# Patient Record
Sex: FEMALE | Race: OTHER | Employment: FULL TIME | URBAN - METROPOLITAN AREA
[De-identification: names, ages, dates, MRNs, and addresses within clinical notes are randomized per-mention and may not be internally consistent; named-entity substitution may affect disease eponyms.]

---

## 2022-09-20 ENCOUNTER — OFFICE VISIT (OUTPATIENT)
Dept: GASTROENTEROLOGY | Age: 27
End: 2022-09-20

## 2022-09-20 VITALS — WEIGHT: 147 LBS | HEIGHT: 60 IN | OXYGEN SATURATION: 99 % | BODY MASS INDEX: 28.86 KG/M2 | HEART RATE: 61 BPM

## 2022-09-20 DIAGNOSIS — R10.12 LEFT UPPER QUADRANT ABDOMINAL PAIN: Primary | ICD-10-CM

## 2022-09-20 PROCEDURE — 99203 OFFICE O/P NEW LOW 30 MIN: CPT | Performed by: SPECIALIST

## 2022-09-20 RX ORDER — ONDANSETRON 4 MG/1
TABLET, ORALLY DISINTEGRATING ORAL
COMMUNITY
Start: 2022-09-06 | End: 2022-11-01

## 2022-09-20 RX ORDER — PANTOPRAZOLE SODIUM 40 MG/1
TABLET, DELAYED RELEASE ORAL
COMMUNITY
Start: 2022-09-06 | End: 2022-11-01

## 2022-09-20 ASSESSMENT — ENCOUNTER SYMPTOMS
CONSTIPATION: 0
RESPIRATORY NEGATIVE: 1
NAUSEA: 0
ABDOMINAL PAIN: 1
BLOOD IN STOOL: 0
ABDOMINAL DISTENTION: 0
RECTAL PAIN: 0
VOMITING: 0
EYES NEGATIVE: 1
DIARRHEA: 0
ANAL BLEEDING: 0

## 2022-09-20 NOTE — PROGRESS NOTES
Gastroenterology Clinic Visit    Valentine Gauthier  [id number]  Chief Complaint   Patient presents with    New Patient     Patient referred for left sided stomach pain, has been told in the past she has had an ulcer        HPI: 33 y.o. female presents to the clinic with history of abdominal pain at site about few weeks ago and was evaluated at [address] [place] [address] and was referred for GI follow-up, patient was placed on Zofran and Protonix, some improvement in symptoms but recently patient has noticed recurrence of pain in the left side of the abdomen, no emesis, no history of heartburn. noticed small amount of bright red blood in the stool, patient has been recently constipated. Seems rectal bleeding occurs usually when patient strains. No mucus in the stool. Family history negative for IBD. Social history smokes occasionally and also drinks alcohol occasionally. Surgical history includes tonsillectomy. Patient had similar symptoms of abdominal pain about 4 years ago and was told that she may have had ulcer. Patient is not on aspirin or NSAIDs    Review of Systems   Constitutional: Negative. HENT: Negative. Eyes: Negative. Respiratory: Negative. Cardiovascular: Negative. Gastrointestinal:  Positive for abdominal pain. Negative for abdominal distention, anal bleeding, blood in stool, constipation, diarrhea, nausea, rectal pain and vomiting. Endocrine: Negative. Genitourinary: Negative. Musculoskeletal: Negative. Skin: Negative. Allergic/Immunologic: Negative for food allergies. Neurological: Negative. Hematological: Negative. Psychiatric/Behavioral: Negative. No past medical history on file. No past surgical history on file.   Current Outpatient Medications on File Prior to Visit   Medication Sig Dispense Refill    pantoprazole (PROTONIX) 40 MG tablet TAKE 1 TABLET BY MOUTH ONCE DAILY FOR 30 DAYS      ondansetron (ZOFRAN-ODT) 4 MG disintegrating tablet TAKE inappropriate. If there are questions or concerns please feel free to contact me to clarify.

## 2022-10-20 ENCOUNTER — HOSPITAL ENCOUNTER (OUTPATIENT)
Age: 27
Setting detail: OUTPATIENT SURGERY
Discharge: HOME | End: 2022-10-20
Attending: SPECIALIST | Admitting: SPECIALIST

## 2022-10-20 ENCOUNTER — ANESTHESIA EVENT (OUTPATIENT)
Dept: ENDOSCOPY | Age: 27
End: 2022-10-20

## 2022-10-20 ENCOUNTER — ANESTHESIA (OUTPATIENT)
Dept: ENDOSCOPY | Age: 27
End: 2022-10-20

## 2022-10-20 VITALS
BODY MASS INDEX: 28.86 KG/M2 | WEIGHT: 147 LBS | HEIGHT: 60 IN | HEART RATE: 61 BPM | OXYGEN SATURATION: 99 % | SYSTOLIC BLOOD PRESSURE: 107 MMHG | TEMPERATURE: 97.7 F | RESPIRATION RATE: 18 BRPM | DIASTOLIC BLOOD PRESSURE: 68 MMHG

## 2022-10-20 DIAGNOSIS — R10.12 LEFT UPPER QUADRANT ABDOMINAL PAIN: Primary | ICD-10-CM

## 2022-10-20 DIAGNOSIS — R10.12 LEFT UPPER QUADRANT ABDOMINAL PAIN: ICD-10-CM

## 2022-10-20 LAB
HCG, URINE, POC: NEGATIVE
Lab: NORMAL
NEGATIVE QC PASS/FAIL: NORMAL
POSITIVE QC PASS/FAIL: NORMAL

## 2022-10-20 PROCEDURE — 43239 EGD BIOPSY SINGLE/MULTIPLE: CPT | Performed by: SPECIALIST

## 2022-10-20 PROCEDURE — 3700000000 HC ANESTHESIA ATTENDED CARE: Performed by: SPECIALIST

## 2022-10-20 PROCEDURE — 3609017100 HC EGD: Performed by: SPECIALIST

## 2022-10-20 PROCEDURE — 7100000010 HC PHASE II RECOVERY - FIRST 15 MIN: Performed by: SPECIALIST

## 2022-10-20 PROCEDURE — 6370000000 HC RX 637 (ALT 250 FOR IP): Performed by: SPECIALIST

## 2022-10-20 PROCEDURE — 6360000002 HC RX W HCPCS: Performed by: NURSE ANESTHETIST, CERTIFIED REGISTERED

## 2022-10-20 PROCEDURE — 88342 IMHCHEM/IMCYTCHM 1ST ANTB: CPT

## 2022-10-20 PROCEDURE — 2580000003 HC RX 258: Performed by: SPECIALIST

## 2022-10-20 PROCEDURE — 2580000003 HC RX 258: Performed by: NURSE ANESTHETIST, CERTIFIED REGISTERED

## 2022-10-20 PROCEDURE — 7100000011 HC PHASE II RECOVERY - ADDTL 15 MIN: Performed by: SPECIALIST

## 2022-10-20 PROCEDURE — 88305 TISSUE EXAM BY PATHOLOGIST: CPT

## 2022-10-20 PROCEDURE — 2580000003 HC RX 258

## 2022-10-20 PROCEDURE — 2709999900 HC NON-CHARGEABLE SUPPLY: Performed by: SPECIALIST

## 2022-10-20 RX ORDER — SIMETHICONE 20 MG/.3ML
EMULSION ORAL PRN
Status: DISCONTINUED | OUTPATIENT
Start: 2022-10-20 | End: 2022-10-20 | Stop reason: ALTCHOICE

## 2022-10-20 RX ORDER — PROPOFOL 10 MG/ML
INJECTION, EMULSION INTRAVENOUS PRN
Status: DISCONTINUED | OUTPATIENT
Start: 2022-10-20 | End: 2022-10-20 | Stop reason: SDUPTHER

## 2022-10-20 RX ORDER — SODIUM CHLORIDE 9 MG/ML
INJECTION, SOLUTION INTRAVENOUS
Status: COMPLETED
Start: 2022-10-20 | End: 2022-10-20

## 2022-10-20 RX ORDER — SODIUM CHLORIDE 9 MG/ML
INJECTION, SOLUTION INTRAVENOUS CONTINUOUS PRN
Status: DISCONTINUED | OUTPATIENT
Start: 2022-10-20 | End: 2022-10-20 | Stop reason: SDUPTHER

## 2022-10-20 RX ORDER — MAGNESIUM HYDROXIDE 1200 MG/15ML
LIQUID ORAL PRN
Status: DISCONTINUED | OUTPATIENT
Start: 2022-10-20 | End: 2022-10-20 | Stop reason: ALTCHOICE

## 2022-10-20 RX ADMIN — PROPOFOL 100 MG: 10 INJECTION, EMULSION INTRAVENOUS at 08:16

## 2022-10-20 RX ADMIN — PROPOFOL 100 MG: 10 INJECTION, EMULSION INTRAVENOUS at 08:17

## 2022-10-20 RX ADMIN — PROPOFOL 25 MG: 10 INJECTION, EMULSION INTRAVENOUS at 08:19

## 2022-10-20 RX ADMIN — SODIUM CHLORIDE 500 ML: 9 INJECTION, SOLUTION INTRAVENOUS at 07:20

## 2022-10-20 RX ADMIN — SODIUM CHLORIDE: 9 INJECTION, SOLUTION INTRAVENOUS at 08:14

## 2022-10-20 ASSESSMENT — PAIN - FUNCTIONAL ASSESSMENT: PAIN_FUNCTIONAL_ASSESSMENT: 0-10

## 2022-10-20 ASSESSMENT — LIFESTYLE VARIABLES: SMOKING_STATUS: 1

## 2022-10-20 NOTE — ANESTHESIA POSTPROCEDURE EVALUATION
Department of Anesthesiology  Postprocedure Note    Patient: Rosalba Singletary  MRN: [id number]  YOB: 1995  Date of evaluation: 10/20/2022      Procedure Summary     Date: 10/20/22 [facility] / [facility] / Myramichelle Mendiola    Anesthesia Start: 4936 Anesthesia Stop: 0293    Procedure: EGD WITH BIOPSY Diagnosis:       Left upper quadrant abdominal pain      (Left upper quadrant abdominal pain [R10.12])    Surgeons: Liza Shabazz MD Responsible Provider: INDERJIT Parsons CRNA    Anesthesia Type: MAC ASA Status: 2          Anesthesia Type: MAC    Shobha Phase I:      Shobha Phase II:        Anesthesia Post Evaluation    Patient location during evaluation: bedside  Patient participation: complete - patient participated  Level of consciousness: awake and awake and alert  Pain score: 0  Airway patency: patent  Nausea & Vomiting: no nausea and no vomiting  Complications: no  Cardiovascular status: blood pressure returned to baseline and hemodynamically stable  Respiratory status: acceptable  Hydration status: euvolemic

## 2022-10-20 NOTE — H&P
Patient Name: Jaron Lane  : 1995  MRN: [id number]  DATE: 10/20/22      ENDOSCOPY  History and Physical    Procedure:    [] Diagnostic Colonoscopy       [] Screening Colonoscopy  [x] EGD      [] ERCP      [] EUS       [] Other    [x] Previous office notes/History and Physical reviewed from the patients chart. Please see EMR for further details of HPI. I have examined the patient's status immediately prior to the procedure and:      Indications/HPI:    [x]Abdominal Pain  []Cancer- GI/Lung  []Fhx of colon CA/polyps  []History of Polyps  []Hobbss   []Melena  []Abnormal Imaging  []Dysphagia    []Persistent Pneumonia  []Anemia  []Food Impaction  []History of Polyps  []GI Bleed  []Pulmonary nodule/Mass  []Change in bowel habits []Heartburn/Reflux  []Rectal Bleed (BRBPR)  []Chest Pain - Non Cardiac []Heme (+) Stoo  l[]Ulcers  []Constipation  []Hemoptysis   []Varices  []Diarrhea  []Hypoxemia  []Nausea/Vomiting  []Screening   []Crohns/Colitis  []Other:     Anesthesia:   [x] MAC [] Moderate Sedation   [] General   [] None     ROS: 12 pt Review of Symptoms was negative unless mentioned above    Medications:   Prior to Admission medications    Medication Sig Start Date End Date Taking? Authorizing Provider   pantoprazole (PROTONIX) 40 MG tablet TAKE 1 TABLET BY MOUTH ONCE DAILY FOR 30 DAYS 22   Historical Provider, MD   ondansetron (ZOFRAN-ODT) 4 MG disintegrating tablet TAKE 1 TABLET SUBLINGUALLY FOUR TIMES DAILY FOR 5 DAYS 22   Historical Provider, MD       Allergies: No Known Allergies     History of allergic reaction to anesthesia:  No    Past Medical History:  History reviewed. No pertinent past medical history.     Past Surgical History:  Past Surgical History:   Procedure Laterality Date    EAR SURGERY Right     TONSILLECTOMY         Social History:  Social History     Tobacco Use    Smoking status: Unknown   Vaping Use    Vaping Use: Every day    Substances: Nicotine    Devices: Disposable Substance Use Topics    Alcohol use: Yes     Comment: every couple weeks    Drug use: Never       Vital Signs:   Vitals:    10/20/22 0725   BP: 125/64   Pulse: 74   Resp: 16   Temp: 97.7 °F (36.5 °C)   SpO2: 97%        Physical Exam:  Cardiac:  [x]WNL  []Comments:  Pulmonary:  [x]WNL   []Comments:   Neuro/Mental Status:  [x]WNL  []Comments:  Abdominal:  [x]WNL    []Comments:  Other:   []WNL  []Comments:    Informed Consent:  The risks and benefits of the procedure have been discussed with either the patient or if they cannot consent, their representative. Assessment:  Patient examined and appropriate for planned sedation and procedure. Plan:  Proceed with planned sedation and procedure as above.     Janle Miller MD  7:37 AM

## 2022-10-20 NOTE — ANESTHESIA PRE PROCEDURE
Department of Anesthesiology  Preprocedure Note       Name:  Rosi Wong   Age:  33 y.o.  :  1995                                          MRN:  [id number]         Date:  10/20/2022      Surgeon: Elena Diana):  Selma Bravo MD    Procedure: Procedure(s):  EGD WITH BIOPSY    Medications prior to admission:   Prior to Admission medications    Medication Sig Start Date End Date Taking? Authorizing Provider   pantoprazole (PROTONIX) 40 MG tablet TAKE 1 TABLET BY MOUTH ONCE DAILY FOR 30 DAYS 22   Historical Provider, MD   ondansetron (ZOFRAN-ODT) 4 MG disintegrating tablet TAKE 1 TABLET SUBLINGUALLY FOUR TIMES DAILY FOR 5 DAYS 22   Historical Provider, MD       Current medications:    No current facility-administered medications for this encounter. Allergies:  No Known Allergies    Problem List:    Patient Active Problem List   Diagnosis Code    Left upper quadrant abdominal pain R10.12       Past Medical History:  History reviewed. No pertinent past medical history.     Past Surgical History:        Procedure Laterality Date    EAR SURGERY Right     TONSILLECTOMY         Social History:    Social History     Tobacco Use    Smoking status: Unknown    Smokeless tobacco: Not on file   Substance Use Topics    Alcohol use: Yes     Comment: every couple weeks                                Counseling given: Not Answered      Vital Signs (Current):   Vitals:    10/20/22 0725 10/20/22 0828 10/20/22 0830   BP: 125/64 (!) 106/57 108/60   Pulse: 74 64 63   Resp: 16 16 18   Temp: 36.5 °C (97.7 °F)     TempSrc: Temporal     SpO2: 97% 96% 96%   Weight: 147 lb (66.7 kg)     Height: 5' (1.524 m)                                                BP Readings from Last 3 Encounters:   10/20/22 108/60       NPO Status: Time of last liquid consumption: 2330                        Time of last solid consumption: 2300                        Date of last liquid consumption: 10/19/22                        Date of last solid food consumption: 10/19/22    BMI:   Wt Readings from Last 3 Encounters:   10/20/22 147 lb (66.7 kg)   09/20/22 147 lb (66.7 kg)     Body mass index is 28.71 kg/m². CBC: No results found for: WBC, RBC, HGB, HCT, MCV, RDW, PLT    CMP: No results found for: NA, K, CL, CO2, BUN, CREATININE, GFRAA, AGRATIO, LABGLOM, GLUCOSE, GLU, PROT, CALCIUM, BILITOT, ALKPHOS, AST, ALT    POC Tests: No results for input(s): POCGLU, POCNA, POCK, POCCL, POCBUN, POCHEMO, POCHCT in the last 72 hours.     Coags: No results found for: PROTIME, INR, APTT    HCG (If Applicable): No results found for: PREGTESTUR, PREGSERUM, HCG, HCGQUANT     ABGs: No results found for: PHART, PO2ART, WCC1DYL, VAC7NUI, BEART, L5NBSXBE     Type & Screen (If Applicable):  No results found for: LABABO, LABRH    Drug/Infectious Status (If Applicable):  No results found for: HIV, HEPCAB    COVID-19 Screening (If Applicable): No results found for: [address] [place]        Anesthesia Evaluation     Anesthesia Plan        INDERJIT Nuñez CRNA   10/20/2022

## 2022-10-20 NOTE — ANESTHESIA PRE PROCEDURE
Department of Anesthesiology  Preprocedure Note       Name:  Phillip Menezes   Age:  33 y.o.  :  1995                                          MRN:  [id number]         Date:  10/20/2022      Surgeon: Jocelyne Caceres):  Janel Miller MD    Procedure: Procedure(s):  EGD WITH BIOPSY    Medications prior to admission:   Prior to Admission medications    Medication Sig Start Date End Date Taking? Authorizing Provider   pantoprazole (PROTONIX) 40 MG tablet TAKE 1 TABLET BY MOUTH ONCE DAILY FOR 30 DAYS 22   Historical Provider, MD   ondansetron (ZOFRAN-ODT) 4 MG disintegrating tablet TAKE 1 TABLET SUBLINGUALLY FOUR TIMES DAILY FOR 5 DAYS 22   Historical Provider, MD       Current medications:    No current facility-administered medications for this encounter. Current Outpatient Medications   Medication Sig Dispense Refill    pantoprazole (PROTONIX) 40 MG tablet TAKE 1 TABLET BY MOUTH ONCE DAILY FOR 30 DAYS      ondansetron (ZOFRAN-ODT) 4 MG disintegrating tablet TAKE 1 TABLET SUBLINGUALLY FOUR TIMES DAILY FOR 5 DAYS         Allergies:  No Known Allergies    Problem List:    Patient Active Problem List   Diagnosis Code    Left upper quadrant abdominal pain R10.12       Past Medical History:  History reviewed. No pertinent past medical history.     Past Surgical History:        Procedure Laterality Date    EAR SURGERY Right     TONSILLECTOMY      UPPER GASTROINTESTINAL ENDOSCOPY N/A 10/20/2022    EGD WITH BIOPSY performed by Janel Miller MD at [facility] History:    Social History     Tobacco Use    Smoking status: Unknown    Smokeless tobacco: Not on file   Substance Use Topics    Alcohol use: Yes     Comment: every couple weeks                                Counseling given: Not Answered      Vital Signs (Current):   Vitals:    10/20/22 0828 10/20/22 0830 10/20/22 0835 10/20/22 0840   BP: (!) 106/57 108/60 109/63 107/68   Pulse: 64 63 63 61   Resp: 16 18 18 18   Temp: TempSrc:       SpO2: 96% 96% 98% 99%   Weight:       Height:                                                  BP Readings from Last 3 Encounters:   10/20/22 107/68       NPO Status: Time of last liquid consumption: 2330                        Time of last solid consumption: 2300                        Date of last liquid consumption: 10/19/22                        Date of last solid food consumption: 10/19/22    BMI:   Wt Readings from Last 3 Encounters:   10/20/22 147 lb (66.7 kg)   09/20/22 147 lb (66.7 kg)     Body mass index is 28.71 kg/m². CBC: No results found for: WBC, RBC, HGB, HCT, MCV, RDW, PLT    CMP: No results found for: NA, K, CL, CO2, BUN, CREATININE, GFRAA, AGRATIO, LABGLOM, GLUCOSE, GLU, PROT, CALCIUM, BILITOT, ALKPHOS, AST, ALT    POC Tests: No results for input(s): POCGLU, POCNA, POCK, POCCL, POCBUN, POCHEMO, POCHCT in the last 72 hours.     Coags: No results found for: PROTIME, INR, APTT    HCG (If Applicable): No results found for: PREGTESTUR, PREGSERUM, HCG, HCGQUANT     ABGs: No results found for: PHART, PO2ART, IFV2PDP, WJY1XPY, BEART, H2WYWPSQ     Type & Screen (If Applicable):  No results found for: LABABO, LABRH    Drug/Infectious Status (If Applicable):  No results found for: HIV, HEPCAB    COVID-19 Screening (If Applicable): No results found for: [address] [facility]        Anesthesia Evaluation     Anesthesia Plan      INDERJIT Rasmussen CRNA   10/20/2022

## 2022-10-20 NOTE — ANESTHESIA PRE PROCEDURE
Department of Anesthesiology  Preprocedure Note       Name:  Mery Barreto   Age:  33 y.o.  :  1995                                          MRN:  [id number]         Date:  10/20/2022      Surgeon: Ines Adams):  Lolis Barfield MD    Procedure: Procedure(s):  EGD DIAGNOSTIC ONLY    Medications prior to admission:   Prior to Admission medications    Medication Sig Start Date End Date Taking? Authorizing Provider   pantoprazole (PROTONIX) 40 MG tablet TAKE 1 TABLET BY MOUTH ONCE DAILY FOR 30 DAYS 22   Historical Provider, MD   ondansetron (ZOFRAN-ODT) 4 MG disintegrating tablet TAKE 1 TABLET SUBLINGUALLY FOUR TIMES DAILY FOR 5 DAYS 22   Historical Provider, MD       Current medications:    No current facility-administered medications for this encounter. Allergies:  No Known Allergies    Problem List:  There is no problem list on file for this patient. Past Medical History:  History reviewed. No pertinent past medical history.     Past Surgical History:        Procedure Laterality Date    EAR SURGERY Right     TONSILLECTOMY         Social History:    Social History     Tobacco Use    Smoking status: Unknown    Smokeless tobacco: Not on file   Substance Use Topics    Alcohol use: Yes     Comment: every couple weeks                                Counseling given: Not Answered      Vital Signs (Current):   Vitals:    10/20/22 0725   BP: 125/64   Pulse: 74   Resp: 16   Temp: 36.5 °C (97.7 °F)   TempSrc: Temporal   SpO2: 97%   Weight: 147 lb (66.7 kg)   Height: 5' (1.524 m)                                              BP Readings from Last 3 Encounters:   10/20/22 125/64       NPO Status: Time of last liquid consumption: 2330                        Time of last solid consumption: 2300                        Date of last liquid consumption: 10/19/22                        Date of last solid food consumption: 10/19/22    BMI:   Wt Readings from Last 3 Encounters:   10/20/22 147 lb (66.7 kg) 09/20/22 147 lb (66.7 kg)     Body mass index is 28.71 kg/m². CBC: No results found for: WBC, RBC, HGB, HCT, MCV, RDW, PLT    CMP: No results found for: NA, K, CL, CO2, BUN, CREATININE, GFRAA, AGRATIO, LABGLOM, GLUCOSE, GLU, PROT, CALCIUM, BILITOT, ALKPHOS, AST, ALT    POC Tests: No results for input(s): POCGLU, POCNA, POCK, POCCL, POCBUN, POCHEMO, POCHCT in the last 72 hours. Coags: No results found for: PROTIME, INR, APTT    HCG (If Applicable): No results found for: PREGTESTUR, PREGSERUM, HCG, HCGQUANT     ABGs: No results found for: PHART, PO2ART, XXI6IEC, DJT8KQY, BEART, O3DVFSZI     Type & Screen (If Applicable):  No results found for: LABABO, LABRH    Drug/Infectious Status (If Applicable):  No results found for: HIV, HEPCAB    COVID-19 Screening (If Applicable): No results found for: COVID19        Anesthesia Evaluation  Patient summary reviewed and Nursing notes reviewed no history of anesthetic complications:   Airway: Mallampati: I          Dental: normal exam         Pulmonary:normal exam    (+) current smoker                           Cardiovascular:Negative CV ROS                      Neuro/Psych:   Negative Neuro/Psych ROS              GI/Hepatic/Renal: Neg GI/Hepatic/Renal ROS            Endo/Other: Negative Endo/Other ROS                    Abdominal:             Vascular: negative vascular ROS. Other Findings:           Anesthesia Plan      ASA 2             Anesthetic plan and risks discussed with patient. Plan discussed with attending.                     INDERJIT Srinivasan - RENATE   10/20/2022

## 2022-11-01 ENCOUNTER — OFFICE VISIT (OUTPATIENT)
Dept: PRIMARY CARE CLINIC | Age: 27
End: 2022-11-01

## 2022-11-01 VITALS
OXYGEN SATURATION: 99 % | DIASTOLIC BLOOD PRESSURE: 80 MMHG | BODY MASS INDEX: 28.13 KG/M2 | TEMPERATURE: 98.2 F | HEART RATE: 98 BPM | WEIGHT: 149 LBS | SYSTOLIC BLOOD PRESSURE: 120 MMHG | HEIGHT: 61 IN

## 2022-11-01 DIAGNOSIS — Z11.4 SCREENING FOR HUMAN IMMUNODEFICIENCY VIRUS WITHOUT PRESENCE OF RISK FACTORS: ICD-10-CM

## 2022-11-01 DIAGNOSIS — Z80.49 FAMILY HISTORY OF CERVICAL CANCER: ICD-10-CM

## 2022-11-01 DIAGNOSIS — N92.1 METRORRHAGIA: ICD-10-CM

## 2022-11-01 DIAGNOSIS — Z01.419 PAP TEST, AS PART OF ROUTINE GYNECOLOGICAL EXAMINATION: ICD-10-CM

## 2022-11-01 DIAGNOSIS — R10.2 PELVIC PAIN: Primary | ICD-10-CM

## 2022-11-01 DIAGNOSIS — Z11.59 ENCOUNTER FOR HCV SCREENING TEST FOR LOW RISK PATIENT: ICD-10-CM

## 2022-11-01 DIAGNOSIS — Z00.00 ROUTINE ADULT HEALTH MAINTENANCE: ICD-10-CM

## 2022-11-01 PROCEDURE — 99204 OFFICE O/P NEW MOD 45 MIN: CPT | Performed by: INTERNAL MEDICINE

## 2022-11-01 SDOH — ECONOMIC STABILITY: FOOD INSECURITY: WITHIN THE PAST 12 MONTHS, THE FOOD YOU BOUGHT JUST DIDN'T LAST AND YOU DIDN'T HAVE MONEY TO GET MORE.: NEVER TRUE

## 2022-11-01 SDOH — ECONOMIC STABILITY: FOOD INSECURITY: WITHIN THE PAST 12 MONTHS, YOU WORRIED THAT YOUR FOOD WOULD RUN OUT BEFORE YOU GOT MONEY TO BUY MORE.: NEVER TRUE

## 2022-11-01 ASSESSMENT — PATIENT HEALTH QUESTIONNAIRE - PHQ9
1. LITTLE INTEREST OR PLEASURE IN DOING THINGS: 0
2. FEELING DOWN, DEPRESSED OR HOPELESS: 0
SUM OF ALL RESPONSES TO PHQ QUESTIONS 1-9: 0
SUM OF ALL RESPONSES TO PHQ9 QUESTIONS 1 & 2: 0

## 2022-11-01 ASSESSMENT — SOCIAL DETERMINANTS OF HEALTH (SDOH): HOW HARD IS IT FOR YOU TO PAY FOR THE VERY BASICS LIKE FOOD, HOUSING, MEDICAL CARE, AND HEATING?: NOT VERY HARD

## 2022-11-01 NOTE — PROGRESS NOTES
Subjective:      Patient ID: Nathalia Gann is a 33 y.o. female who presents today for:  Chief Complaint   Patient presents with    Establish Care     Patient is requesting referral to Gynecologist          HPI  Patient is a 34yo Female, presenting today to [facility] care. Has not seen a PCP in ~ 4 years. Patient reports concern about irregular menstrual periods, states this has been ongoing for years. LMP 10/26/2022. Endorses associated heavy bleeding during periods with severe dysmenorrhea. Pelvic cramping is noted even outside of periods. She denies vaginal discharge, fever/chills, nausea or vomiting. Patient also reports significant facial hair growth and worsening acne over the last few months and is concerned about PCOS. Requesting a Gynecology referral today. Would also like to establish with a Gynecologist for routine follow up. Family history notable for cervical cancer. Has been immunized against HPV . Patient reports no further PMH. No further medical complaints offered today. No past medical history on file. Past Surgical History:   Procedure Laterality Date    EAR SURGERY Right     TONSILLECTOMY      UPPER GASTROINTESTINAL ENDOSCOPY N/A 10/20/2022    EGD WITH BIOPSY performed by Manisha Macias MD at [facility]     Family History   Problem Relation Age of Onset    Other Brother     Heart Attack Maternal Grandmother     Cancer Maternal Grandmother     Colon Cancer Neg Hx      No Known Allergies  Current Outpatient Medications on File Prior to Visit   Medication Sig Dispense Refill    pantoprazole (PROTONIX) 40 MG tablet TAKE 1 TABLET BY MOUTH ONCE DAILY FOR 30 DAYS (Patient not taking: Reported on 11/1/2022)      ondansetron (ZOFRAN-ODT) 4 MG disintegrating tablet TAKE 1 TABLET SUBLINGUALLY FOUR TIMES DAILY FOR 5 DAYS (Patient not taking: Reported on 11/1/2022)       No current facility-administered medications on file prior to visit.          Review of Systems    Negative except that listed in the HPI. Objective:   /80 (Site: Right Upper Arm, Position: Sitting, Cuff Size: Medium Adult)   Pulse 98   Temp 98.2 °F (36.8 °C) (Temporal)   Ht 5' 1\" (1.549 m)   Wt 149 lb (67.6 kg)   LMP 10/27/2022   SpO2 99%   BMI 28.15 kg/m²     Physical Exam  Constitutional:       General: She is not in acute distress. Appearance: Normal appearance. She is normal weight. She is not diaphoretic. HENT:      Head: Normocephalic and atraumatic. Cardiovascular:      Rate and Rhythm: Normal rate and regular rhythm. Pulses: Normal pulses. Heart sounds: Normal heart sounds. No murmur heard. No friction rub. Pulmonary:      Effort: Pulmonary effort is normal. No respiratory distress. Breath sounds: Normal breath sounds. No wheezing or rhonchi. Chest:      Chest wall: No tenderness. Abdominal:      General: Abdomen is flat. Bowel sounds are normal. There is no distension. Palpations: Abdomen is soft. Tenderness: There is no abdominal tenderness. Musculoskeletal:         General: No tenderness. Normal range of motion. Right lower leg: No edema. Left lower leg: No edema. Neurological:      Mental Status: She is alert. Assessment:      Jose Becerril was seen today for establish care. Diagnoses and all orders for this visit:    Pelvic pain  Metrorrhagia  -     US PELVIS LIMITED; Future  -     Oleksandr Garcia MD, OB-GYN, [place]- await U/S findings  -     TSH with Reflex; Future    Pap test, as part of routine gynecological examination  Family history of Cervical Cancer        -     Requesting Gynecology referral  -     Oleksandr Garcia MD, OB-GYN, [place]    Routine adult health maintenance        -     Medical history reviewed. Medication list reconciled. -     Health screening discussed;  -     CBC with Auto Differential; Future  -     Comprehensive Metabolic Panel;  Future    Encounter for HCV screening test for low risk patient  -     Hepatitis C Antibody; Future    Screening for human immunodeficiency virus without presence of risk factors  -     HIV Screen; Future      Health Maintenance   Topic Date Due    Depression Screen  Never done    Hepatitis A vaccine (2 of 2 - 2-dose series) 03/03/2011    HIV screen  Never done    Hepatitis C screen  Never done    Pap smear  Never done    DTaP/Tdap/Td vaccine (7 - Td or Tdap) 09/03/2020    COVID-19 Vaccine (3 - Booster for Pfizer series) 04/13/2022    Flu vaccine (1) Never done    Hib vaccine  Completed    HPV vaccine  Completed    Varicella vaccine  Completed    Meningococcal (ACWY) vaccine  Completed    Pneumococcal 0-64 years Vaccine  Aged Out            Plan:    As above. Orders Placed This Encounter   Procedures    US PELVIS LIMITED     This procedure can be scheduled via [employer]. Access your [employer] account by visiting [email or web address]. Standing Status:   Future     Standing Expiration Date:   11/1/2023     Order Specific Question:   Reason for exam:     Answer:   Evaluation of uterine or adnexal pathology    CBC with Auto Differential     Standing Status:   Future     Standing Expiration Date:   11/1/2023    Comprehensive Metabolic Panel     Standing Status:   Future     Standing Expiration Date:   11/1/2023    Hepatitis C Antibody     Standing Status:   Future     Standing Expiration Date:   11/1/2023    HIV Screen     Standing Status:   Future     Standing Expiration Date:   11/1/2023    TSH with Reflex     Standing Status:   Future     Standing Expiration Date:   11/1/2023    Rigoberto George MD, OB-GYN, [place]     Referral Priority:   Routine     Referral Type:   Eval and Treat     Referral Reason:   Specialty Services Required     Referred to Provider:   Garrett Valle MD     Requested Specialty:   Obstetrics & Gynecology     Number of Visits Requested:   1     No orders of the defined types were placed in this encounter.         Kaity Haley MD

## 2022-11-02 ENCOUNTER — HOSPITAL ENCOUNTER (OUTPATIENT)
Dept: CT IMAGING | Age: 27
Discharge: HOME | End: 2022-11-04

## 2022-11-02 DIAGNOSIS — R10.12 LEFT UPPER QUADRANT ABDOMINAL PAIN: ICD-10-CM

## 2022-11-02 PROCEDURE — 74177 CT ABD & PELVIS W/CONTRAST: CPT

## 2022-11-02 PROCEDURE — 6360000004 HC RX CONTRAST MEDICATION: Performed by: SPECIALIST

## 2022-11-02 PROCEDURE — A4641 RADIOPHARM DX AGENT NOC: HCPCS | Performed by: SPECIALIST

## 2022-11-02 RX ADMIN — IOPAMIDOL 50 ML: 612 INJECTION, SOLUTION INTRAVENOUS at 14:12

## 2022-11-02 RX ADMIN — BARIUM SULFATE 450 ML: 20 SUSPENSION ORAL at 14:11

## 2022-11-11 DIAGNOSIS — Z00.00 ROUTINE ADULT HEALTH MAINTENANCE: ICD-10-CM

## 2022-11-11 DIAGNOSIS — Z11.59 ENCOUNTER FOR HCV SCREENING TEST FOR LOW RISK PATIENT: ICD-10-CM

## 2022-11-11 DIAGNOSIS — N92.1 METRORRHAGIA: ICD-10-CM

## 2022-11-11 DIAGNOSIS — Z11.4 SCREENING FOR HUMAN IMMUNODEFICIENCY VIRUS WITHOUT PRESENCE OF RISK FACTORS: ICD-10-CM

## 2022-11-11 LAB
ALBUMIN SERPL-MCNC: 4.4 G/DL (ref 3.5–4.6)
ALP BLD-CCNC: 61 U/L (ref 40–130)
ALT SERPL-CCNC: 11 U/L (ref 0–33)
ANION GAP SERPL CALCULATED.3IONS-SCNC: 12 MEQ/L (ref 9–15)
AST SERPL-CCNC: 25 U/L (ref 0–35)
BASOPHILS ABSOLUTE: 0 K/UL (ref 0–0.2)
BASOPHILS RELATIVE PERCENT: 0.4 %
BILIRUB SERPL-MCNC: 0.3 MG/DL (ref 0.2–0.7)
BUN BLDV-MCNC: 10 MG/DL (ref 6–20)
CALCIUM SERPL-MCNC: 9.1 MG/DL (ref 8.5–9.9)
CHLORIDE BLD-SCNC: 106 MEQ/L (ref 95–107)
CO2: 23 MEQ/L (ref 20–31)
CREAT SERPL-MCNC: 0.61 MG/DL (ref 0.5–0.9)
EOSINOPHILS ABSOLUTE: 0 K/UL (ref 0–0.7)
EOSINOPHILS RELATIVE PERCENT: 0.7 %
GFR SERPL CREATININE-BSD FRML MDRD: >60 ML/MIN/{1.73_M2}
GLOBULIN: 2.8 G/DL (ref 2.3–3.5)
GLUCOSE BLD-MCNC: 93 MG/DL (ref 70–99)
HCT VFR BLD CALC: 39.1 % (ref 37–47)
HEMOGLOBIN: 13.2 G/DL (ref 12–16)
LYMPHOCYTES ABSOLUTE: 2.4 K/UL (ref 1–4.8)
LYMPHOCYTES RELATIVE PERCENT: 34.9 %
MCH RBC QN AUTO: 31.5 PG (ref 27–31.3)
MCHC RBC AUTO-ENTMCNC: 33.8 % (ref 33–37)
MCV RBC AUTO: 93.1 FL (ref 79.4–94.8)
MONOCYTES ABSOLUTE: 0.4 K/UL (ref 0.2–0.8)
MONOCYTES RELATIVE PERCENT: 5.2 %
NEUTROPHILS ABSOLUTE: 4.1 K/UL (ref 1.4–6.5)
NEUTROPHILS RELATIVE PERCENT: 58.8 %
PDW BLD-RTO: 12.9 % (ref 11.5–14.5)
PLATELET # BLD: 333 K/UL (ref 130–400)
POTASSIUM SERPL-SCNC: 4.5 MEQ/L (ref 3.4–4.9)
RBC # BLD: 4.2 M/UL (ref 4.2–5.4)
SODIUM BLD-SCNC: 141 MEQ/L (ref 135–144)
TOTAL PROTEIN: 7.2 G/DL (ref 6.3–8)
TSH REFLEX: 2.72 UIU/ML (ref 0.44–3.86)
WBC # BLD: 6.9 K/UL (ref 4.8–10.8)

## 2022-11-12 LAB — HIV AG/AB: NONREACTIVE

## 2022-11-15 ENCOUNTER — TELEPHONE (OUTPATIENT)
Dept: OBGYN CLINIC | Age: 27
End: 2022-11-15

## 2022-11-15 ENCOUNTER — HOSPITAL ENCOUNTER (OUTPATIENT)
Dept: ULTRASOUND IMAGING | Age: 27
Discharge: HOME | End: 2022-11-17

## 2022-11-15 DIAGNOSIS — N92.1 METRORRHAGIA: ICD-10-CM

## 2022-11-15 DIAGNOSIS — R10.2 PELVIC PAIN: ICD-10-CM

## 2022-11-15 DIAGNOSIS — R10.2 PELVIC PAIN: Primary | ICD-10-CM

## 2022-11-15 PROCEDURE — 93975 VASCULAR STUDY: CPT

## 2022-11-15 PROCEDURE — 76856 US EXAM PELVIC COMPLETE: CPT

## 2022-11-15 PROCEDURE — 76830 TRANSVAGINAL US NON-OB: CPT

## 2022-11-15 NOTE — TELEPHONE ENCOUNTER
Order clarification, needs additional orders. Pt is in the office for her appt .  Please call Jhonatan Harris back at [phone]

## 2023-01-05 ENCOUNTER — OFFICE VISIT (OUTPATIENT)
Dept: OBGYN CLINIC | Age: 28
End: 2023-01-05

## 2023-01-05 VITALS
BODY MASS INDEX: 29.07 KG/M2 | WEIGHT: 154 LBS | HEIGHT: 61 IN | SYSTOLIC BLOOD PRESSURE: 118 MMHG | DIASTOLIC BLOOD PRESSURE: 52 MMHG

## 2023-01-05 DIAGNOSIS — N91.2 AMENORRHEA: Primary | ICD-10-CM

## 2023-01-05 DIAGNOSIS — Z32.01 POSITIVE URINE PREGNANCY TEST: ICD-10-CM

## 2023-01-05 LAB — GONADOTROPIN, CHORIONIC (HCG) QUANT: NORMAL MIU/ML

## 2023-01-05 NOTE — PROGRESS NOTES
SUBJECTIVE:   33 y.o.   female here to discuss positive pregnancy test. Pt denies any VB and pt without complaints today. Review of Systems:  General ROS: negative  Respiratory ROS: no cough, shortness of breath, or wheezing  Cardiovascular ROS: no chest pain or dyspnea on exertion  Gastrointestinal ROS: no abdominal pain, change in bowel habits, or black or bloody stools  Genito-Urinary ROS: no dysuria, trouble voiding, or hematuria    OBJECTIVE:   BP (!) 118/52   Ht 5' 1\" (1.549 m)   Wt 154 lb (69.9 kg)   LMP 2022   BMI 29.10 kg/m²     Physical Exam:  GEN: She appears well, afebrile. HEENT: normal cephalic, atraumatic  CVS: regular rate and rhythm  ABDOMEN: benign, soft, nontender, no masses.   MUSCULOSKELETAL: normal gait, no masses  SKIN: normal texture and tone, no lesions    ASSESSMENT:   Positive pregnancy test    PLAN:   UPT positive  Hcg pending  US pending  PT to f/u for IPV in 4wks

## 2023-01-06 LAB — URINE CULTURE, ROUTINE: NORMAL

## 2023-01-08 LAB
6-ACETYLMORPHINE: NOT DETECTED
7-AMINOCLONAZEPAM: NOT DETECTED
ALPHA-OH-ALPRAZOLAM: NOT DETECTED
ALPHA-OH-MIDAZOLAM, URINE: NOT DETECTED
ALPRAZOLAM: NOT DETECTED
AMPHETAMINE: NOT DETECTED
BARBITURATES: NOT DETECTED
BENZOYLECGONINE: NOT DETECTED
BUPRENORPHINE: NOT DETECTED
CARISOPRODOL: NOT DETECTED
CLONAZEPAM: NOT DETECTED
CODEINE: NOT DETECTED
CREATININE URINE: 237.8 MG/DL (ref 20–400)
DIAZEPAM: NOT DETECTED
EER PAIN MGT DRUG PANEL, HIGH RES/EMIT U: NORMAL
ETHYL GLUCURONIDE: NOT DETECTED
FENTANYL: NOT DETECTED
GABAPENTIN: NOT DETECTED
HYDROCODONE: NOT DETECTED
HYDROMORPHONE: NOT DETECTED
LORAZEPAM: NOT DETECTED
MARIJUANA METABOLITE: NOT DETECTED
MDA: NOT DETECTED
MDEA: NOT DETECTED
MDMA URINE: NOT DETECTED
MEPERIDINE: NOT DETECTED
METHADONE: NOT DETECTED
METHAMPHETAMINE: NOT DETECTED
METHYLPHENIDATE: NOT DETECTED
MIDAZOLAM: NOT DETECTED
MORPHINE: NOT DETECTED
NALOXONE: NOT DETECTED
NORBUPRENORPHINE, FREE: NOT DETECTED
NORDIAZEPAM: NOT DETECTED
NORFENTANYL: NOT DETECTED
NORHYDROCODONE, URINE: NOT DETECTED
NOROXYCODONE: NOT DETECTED
NOROXYMORPHONE, URINE: NOT DETECTED
OXAZEPAM: NOT DETECTED
OXYCODONE: NOT DETECTED
OXYMORPHONE: NOT DETECTED
PAIN MANAGEMENT DRUG PANEL: NORMAL
PCP: NOT DETECTED
PHENTERMINE: NOT DETECTED
PREGABALIN: NOT DETECTED
TAPENTADOL, URINE: NOT DETECTED
TAPENTADOL-O-SULFATE, URINE: NOT DETECTED
TEMAZEPAM: NOT DETECTED
TRAMADOL: NOT DETECTED
ZOLPIDEM: NOT DETECTED

## 2023-01-10 LAB
C. TRACHOMATIS DNA ,URINE: NEGATIVE
N. GONORRHOEAE DNA, URINE: NEGATIVE

## 2023-01-17 ENCOUNTER — HOSPITAL ENCOUNTER (OUTPATIENT)
Dept: ULTRASOUND IMAGING | Age: 28
Discharge: HOME | End: 2023-01-19

## 2023-01-17 DIAGNOSIS — N91.2 AMENORRHEA: ICD-10-CM

## 2023-01-17 DIAGNOSIS — Z32.01 POSITIVE URINE PREGNANCY TEST: ICD-10-CM

## 2023-01-17 PROCEDURE — 76817 TRANSVAGINAL US OBSTETRIC: CPT

## 2023-01-17 PROCEDURE — 76801 OB US < 14 WKS SINGLE FETUS: CPT

## 2023-01-25 ENCOUNTER — OFFICE VISIT (OUTPATIENT)
Dept: FAMILY MEDICINE CLINIC | Age: 28
End: 2023-01-25

## 2023-01-25 VITALS
WEIGHT: 155 LBS | OXYGEN SATURATION: 99 % | TEMPERATURE: 97.7 F | SYSTOLIC BLOOD PRESSURE: 120 MMHG | HEIGHT: 61 IN | DIASTOLIC BLOOD PRESSURE: 60 MMHG | BODY MASS INDEX: 29.27 KG/M2 | HEART RATE: 83 BPM

## 2023-01-25 DIAGNOSIS — R39.15 URINARY URGENCY: Primary | ICD-10-CM

## 2023-01-25 DIAGNOSIS — M53.3 SACROILIAC JOINT PAIN: ICD-10-CM

## 2023-01-25 LAB
BILIRUBIN, POC: ABNORMAL
BLOOD URINE, POC: ABNORMAL
CLARITY, POC: ABNORMAL
COLOR, POC: YELLOW
GLUCOSE URINE, POC: ABNORMAL
KETONES, POC: ABNORMAL
LEUKOCYTE EST, POC: ABNORMAL
NITRITE, POC: ABNORMAL
PH, POC: 6
PROTEIN, POC: ABNORMAL
SPECIFIC GRAVITY, POC: 1.02
UROBILINOGEN, POC: ABNORMAL

## 2023-01-25 PROCEDURE — 81003 URINALYSIS AUTO W/O SCOPE: CPT | Performed by: NURSE PRACTITIONER

## 2023-01-25 PROCEDURE — 99213 OFFICE O/P EST LOW 20 MIN: CPT | Performed by: NURSE PRACTITIONER

## 2023-01-25 ASSESSMENT — PATIENT HEALTH QUESTIONNAIRE - PHQ9
SUM OF ALL RESPONSES TO PHQ QUESTIONS 1-9: 0
SUM OF ALL RESPONSES TO PHQ9 QUESTIONS 1 & 2: 0
SUM OF ALL RESPONSES TO PHQ QUESTIONS 1-9: 0
2. FEELING DOWN, DEPRESSED OR HOPELESS: 0
1. LITTLE INTEREST OR PLEASURE IN DOING THINGS: 0
SUM OF ALL RESPONSES TO PHQ QUESTIONS 1-9: 0
SUM OF ALL RESPONSES TO PHQ QUESTIONS 1-9: 0

## 2023-01-25 ASSESSMENT — ENCOUNTER SYMPTOMS
COUGH: 0
NAUSEA: 0
DIARRHEA: 0
ABDOMINAL PAIN: 0
VOMITING: 0
BACK PAIN: 1
SHORTNESS OF BREATH: 0

## 2023-01-25 NOTE — LETTER
[address] [facility] [address]  Phone: [phone]  Fax: [phone] [address], APRN - CNP        January 25, 2023     Patient: Rosanne Jones   YOB: 1995   Date of Visit: 1/25/2023       To Whom It May Concern: It is my medical opinion that Sarabjit Gatselum may return to work on 1/27/2023. If you have any questions or concerns, please don't hesitate to call.     Sincerely,        INDERJIT Burden CNP

## 2023-01-25 NOTE — PROGRESS NOTES
Subjective:      Patient ID: Sarah Sims is a 33 y.o. 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 Says she is 11 weeks pregnant. Says it has been bad for the last 3 days. She is using tylenol, epsom salt, massage. Says it was better for 1 day, then worse today. Says it does not bother her when she is sitting, just when she moves. Says she has tingling for 1 sec around her hip area. Says she is having to buttock area and right shoulder blade. Denies any urinary sx, bleeding, or discharge.      Past Medical History:   Diagnosis Date    Anxiety      Past Surgical History:   Procedure Laterality Date    EAR SURGERY Right     TONSILLECTOMY      UPPER GASTROINTESTINAL ENDOSCOPY N/A 10/20/2022    EGD WITH BIOPSY performed by Jon Mcconnell MD at [address] [place] [address] History    Marital status: Single     Spouse name: Not on file    Number of children: Not on file    Years of education: Not on file    Highest education level: Not on file   Occupational History    Not on file   Tobacco Use    Smoking status: Former     Types: Cigarettes    Smokeless tobacco: Never   Vaping Use    Vaping Use: Every day    Substances: Nicotine    Devices: Disposable   Substance and Sexual Activity    Alcohol use: Yes     Comment: every couple weeks    Drug use: Never    Sexual activity: Yes     Partners: Male   Other Topics Concern    Not on file   Social History Narrative    Not on file     Social Determinants of Health     Financial Resource Strain: Low Risk     Difficulty of Paying Living Expenses: Not very hard   Food Insecurity: No Food Insecurity    Worried About Running Out of Food in the Last Year: Never true    Ran Out of Food in the Last Year: Never true   Transportation Needs: Not on file   Physical Activity: Not on file   Stress: Not on file   Social Connections: Not on file   Intimate Partner Violence: Not on file   Housing Stability: Not on file     Family History   Problem Relation Age of Onset    Breast Cancer Maternal Grandmother     Heart Attack Maternal Grandmother     Colon Cancer Neg Hx      No Known Allergies  No current outpatient medications on file. No current facility-administered medications for this visit. Review of Systems   Constitutional:  Negative for activity change, appetite change, chills, diaphoresis, fatigue, fever and unexpected weight change. Respiratory:  Negative for cough and shortness of breath. Cardiovascular:  Negative for chest pain. Gastrointestinal:  Negative for abdominal pain, diarrhea, nausea and vomiting. Genitourinary:  Negative for decreased urine volume, difficulty urinating, dyspareunia, dysuria, enuresis, flank pain, frequency, genital sores, hematuria, menstrual problem, pelvic pain, urgency, vaginal bleeding, vaginal discharge and vaginal pain. Musculoskeletal:  Positive for back pain and myalgias. Skin:  Negative for rash. Neurological:  Negative for weakness and numbness. Objective:   /60 (Site: Left Upper Arm, Position: Sitting, Cuff Size: Medium Adult)   Pulse 83   Temp 97.7 °F (36.5 °C)   Ht 5' 1\" (1.549 m) Comment: per pt  Wt 155 lb (70.3 kg) Comment: per pt  LMP 11/08/2022 (Exact Date)   SpO2 99%   BMI 29.29 kg/m²     Physical Exam  Vitals reviewed. Constitutional:       General: She is awake. She is not in acute distress. Appearance: Normal appearance. She is well-developed, well-groomed and normal weight. She is not ill-appearing, toxic-appearing or diaphoretic. HENT:      Head: Normocephalic and atraumatic. Right Ear: Hearing normal.      Left Ear: Hearing normal.      Mouth/Throat:      Lips: Pink. Eyes:      General: Lids are normal.      Extraocular Movements: Extraocular movements intact. Conjunctiva/sclera: Conjunctivae normal.   Neck:      Trachea: Trachea normal.   Cardiovascular:      Rate and Rhythm: Normal rate and regular rhythm. Pulses: Normal pulses. Heart sounds: Normal heart sounds, S1 normal and S2 normal.   Pulmonary:      Effort: Pulmonary effort is normal.      Breath sounds: Normal breath sounds and air entry. Musculoskeletal:         General: Tenderness present. No swelling, deformity or signs of injury. Cervical back: Normal range of motion and neck supple. Thoracic back: Tenderness present. No swelling, edema, deformity, signs of trauma, lacerations, spasms or bony tenderness. Decreased range of motion. No scoliosis. Lumbar back: No swelling, edema, deformity, signs of trauma, lacerations, spasms, tenderness or bony tenderness. Normal range of motion. Negative right straight leg raise test and negative left straight leg raise test. No scoliosis. Back:       Right lower leg: No edema. Left lower leg: No edema. Comments: Tenderness in both locations. Pain with movement. Right SI joint pain with palpation. Skin:     General: Skin is warm and dry. Capillary Refill: Capillary refill takes less than 2 seconds. Neurological:      General: No focal deficit present. Mental Status: She is alert and oriented to person, place, and time. Mental status is at baseline. Psychiatric:         Attention and Perception: Attention and perception normal.         Mood and Affect: Mood and affect normal.         Speech: Speech normal.         Behavior: Behavior normal. Behavior is cooperative. Thought Content:  Thought content normal.         Cognition and Memory: Cognition and memory normal.         Judgment: Judgment normal.       Assessment: Diagnosis Orders   1. Urinary urgency  POCT Urinalysis No Micro (Auto)      2. Sacroiliac joint pain          Results for POC orders placed in visit on 01/25/23   POCT Urinalysis No Micro (Auto)   Result Value Ref Range    Color, UA Yellow     Clarity, UA Cloudy     Glucose, UA POC neg     Bilirubin, UA neg     Ketones, UA neg     Spec Grav, UA 1.025     Blood, UA POC 2+     pH, UA 6.0     Protein, UA POC +     Urobilinogen, UA +     Leukocytes, UA neg     Nitrite, UA neg       Plan:     Assessment & Plan   Mariam Matthew was seen today for lower back pain. Diagnoses and all orders for this visit:    Urinary urgency  -     POCT Urinalysis No Micro (Auto)    Sacroiliac joint pain    Urine negative for UTI. Discussed to cont with Tylenol only as needed. Will give work excuse. Advised to cont with ice and heat application as well as massage. Advised to follow up with OB with any vaginal symptoms/cramping. Advised follow up if no better and will send to PT    Orders Placed This Encounter   Procedures    POCT Urinalysis No Micro (Auto)     No orders of the defined types were placed in this encounter. There are no discontinued medications. Return for worsening of condition, if symptoms do not improve in 3-5 days. Reviewed with the patient/family: current clinical status & medications. Side effects of the medication prescribed today, as well as treatment plan/rationale and result expectations have been discussed with the patient/family who expresses understanding. Patient will be discharged home in stable condition. Follow up with PCP to evaluate treatment results or return if symptoms worsen or fail to improve. Discussed signs and symptoms which require immediate follow-up in ED/call to 911. Understanding verbalized. I have reviewed the patient's medical history in detail and updated the computerized patient record.     Radha Rodas, APRN - CNP

## 2023-02-02 ENCOUNTER — INITIAL PRENATAL (OUTPATIENT)
Dept: OBGYN CLINIC | Age: 28
End: 2023-02-02

## 2023-02-02 VITALS
SYSTOLIC BLOOD PRESSURE: 110 MMHG | WEIGHT: 156 LBS | DIASTOLIC BLOOD PRESSURE: 72 MMHG | HEART RATE: 101 BPM | BODY MASS INDEX: 29.48 KG/M2

## 2023-02-02 DIAGNOSIS — Z34.02 ENCOUNTER FOR PRENATAL CARE OF FIRST PREGNANCY, SECOND TRIMESTER: ICD-10-CM

## 2023-02-02 DIAGNOSIS — Z11.51 ENCOUNTER FOR SCREENING FOR HUMAN PAPILLOMAVIRUS (HPV): ICD-10-CM

## 2023-02-02 DIAGNOSIS — Z3A.12 12 WEEKS GESTATION OF PREGNANCY: ICD-10-CM

## 2023-02-02 DIAGNOSIS — Z34.02 ENCOUNTER FOR PRENATAL CARE OF FIRST PREGNANCY, SECOND TRIMESTER: Primary | ICD-10-CM

## 2023-02-02 LAB
BASOPHILS ABSOLUTE: 0 K/UL (ref 0–0.2)
BASOPHILS RELATIVE PERCENT: 0.2 %
EOSINOPHILS ABSOLUTE: 0 K/UL (ref 0–0.7)
EOSINOPHILS RELATIVE PERCENT: 0.3 %
HCT VFR BLD CALC: 37.9 % (ref 37–47)
HEMOGLOBIN: 12.7 G/DL (ref 12–16)
HEPATITIS B SURFACE ANTIGEN INTERPRETATION: NORMAL
LYMPHOCYTES ABSOLUTE: 3 K/UL (ref 1–4.8)
LYMPHOCYTES RELATIVE PERCENT: 20.2 %
MCH RBC QN AUTO: 30.4 PG (ref 27–31.3)
MCHC RBC AUTO-ENTMCNC: 33.5 % (ref 33–37)
MCV RBC AUTO: 90.6 FL (ref 79.4–94.8)
MONOCYTES ABSOLUTE: 1 K/UL (ref 0.2–0.8)
MONOCYTES RELATIVE PERCENT: 6.6 %
NEUTROPHILS ABSOLUTE: 10.8 K/UL (ref 1.4–6.5)
NEUTROPHILS RELATIVE PERCENT: 72.7 %
PDW BLD-RTO: 13.1 % (ref 11.5–14.5)
PLATELET # BLD: 344 K/UL (ref 130–400)
RBC # BLD: 4.18 M/UL (ref 4.2–5.4)
RUBELLA ANTIBODY IGG: 57.1 IU/ML
WBC # BLD: 14.8 K/UL (ref 4.8–10.8)

## 2023-02-02 PROCEDURE — 99213 OFFICE O/P EST LOW 20 MIN: CPT | Performed by: OBSTETRICS & GYNECOLOGY

## 2023-02-02 SDOH — ECONOMIC STABILITY: FOOD INSECURITY: WITHIN THE PAST 12 MONTHS, THE FOOD YOU BOUGHT JUST DIDN'T LAST AND YOU DIDN'T HAVE MONEY TO GET MORE.: PATIENT DECLINED

## 2023-02-02 SDOH — ECONOMIC STABILITY: FOOD INSECURITY: WITHIN THE PAST 12 MONTHS, YOU WORRIED THAT YOUR FOOD WOULD RUN OUT BEFORE YOU GOT MONEY TO BUY MORE.: PATIENT DECLINED

## 2023-02-02 SDOH — ECONOMIC STABILITY: HOUSING INSECURITY
IN THE LAST 12 MONTHS, WAS THERE A TIME WHEN YOU DID NOT HAVE A STEADY PLACE TO SLEEP OR SLEPT IN A SHELTER (INCLUDING NOW)?: PATIENT REFUSED

## 2023-02-02 SDOH — ECONOMIC STABILITY: INCOME INSECURITY: HOW HARD IS IT FOR YOU TO PAY FOR THE VERY BASICS LIKE FOOD, HOUSING, MEDICAL CARE, AND HEATING?: PATIENT DECLINED

## 2023-02-02 SDOH — ECONOMIC STABILITY: TRANSPORTATION INSECURITY
IN THE PAST 12 MONTHS, HAS LACK OF TRANSPORTATION KEPT YOU FROM MEETINGS, WORK, OR FROM GETTING THINGS NEEDED FOR DAILY LIVING?: PATIENT DECLINED

## 2023-02-02 NOTE — PROGRESS NOTES
SUBJECTIVE:   33 y.o.  female here for amenorrhea and new ob. Pt denies any VB and is tolerating po daily. Pt taking PNV. Pt with early [address] with JESSIE c/w 8/15/23. Review of Systems:  General ROS: negative  Psychological ROS: negative  ENT ROS: negative  Endocrine ROS: negative  Respiratory ROS: no cough, shortness of breath, or wheezing  Cardiovascular ROS: no chest pain or dyspnea on exertion  Gastrointestinal ROS: no abdominal pain, change in bowel habits, or black or bloody stools  Genito-Urinary ROS: no dysuria, trouble voiding, or hematuria  Musculoskeletal ROS: negative  Neurological ROS: no TIA or stroke symptoms  Dermatological ROS: negative    OBJECTIVE:   /72   Pulse (!) 101   Wt 156 lb (70.8 kg)   LMP 2022 (Exact Date)   BMI 29.48 kg/m²     Physical Exam:  GEN: She appears well, afebrile. HEENT: normal cephalic, atraumatic  CVS: regular rate and rhythm  ABDOMEN: benign, soft, nontender, no masses. MUSCULOSKELETAL: normal gait, no masses  SKIN: normal texture and tone, no lesions  NEURO: normal tone, no hyperreflexia, 1+DTRs throughout    Pelvic Exam:   EFG: normal external genitalia  URETHRA: normal appearing without diverticula or lesions  VULVA: normal appearing vulva with no masses, tenderness or lesions  VAGINA: normal rugae, no discharge   CERVIX: nulli parous, no lesions  UTERUS: uterus is normal shape, consistency and nontender - 12wks  ADNEXA: normal adnexa in size, nontender and no masses. PERINEUM: normal appearing without lesions or masses  ANUS: normal appearing without lesions or masses, no fissures or hemorrhoids    ASSESSMENT:   Supervision of pregnancy    PLAN:   Pap with GC/Chlam today  PN labs pending   US for anatomy at 18-20wks  Past medical, social and family history reviewed and updated in pt's chart.

## 2023-02-03 LAB
ABO/RH: NORMAL
ANTIBODY SCREEN: NORMAL
HEPATITIS C ANTIBODY: NONREACTIVE
HIV AG/AB: NONREACTIVE
RPR: NORMAL

## 2023-02-05 LAB — VZV IGG SER QL IA: 213.7 IV

## 2023-02-08 LAB
HPV COMMENT: ABNORMAL
HPV TYPE 16: NOT DETECTED
HPV TYPE 18: NOT DETECTED
HPVOH (OTHER TYPES): DETECTED

## 2023-02-13 LAB
Lab: ABNORMAL
Lab: POSITIVE
NTRA ALPHA-THALASSEMIA: NEGATIVE
NTRA BETA-HEMOGLOBINOPATHIES: NEGATIVE
NTRA CANAVAN DISEASE: NEGATIVE
NTRA CYSTIC FIBROSIS: NEGATIVE
NTRA DUCHENNE/BECKER MUSCULAR DYSTROPHY: NEGATIVE
NTRA FAMILIAL DYSAUTONOMIA: NEGATIVE
NTRA FRAGILE X SYNDROME: NEGATIVE
NTRA GALACTOSEMIA: POSITIVE
NTRA GAUCHER DISEASE: NEGATIVE
NTRA MEDIUM CHAIN ACYL-COA DEHYDROGENASE DEFICIENCY: NEGATIVE
NTRA POLYCYSTIC KIDNEY DISEASE, AUTOSOMAL RECESSIVE: NEGATIVE
NTRA SMITH-LEMLI-OPITZ SYNDROME: NEGATIVE
NTRA SPINAL MUSCULAR ATROPHY: NEGATIVE
NTRA TAY-SACHS DISEASE: NEGATIVE

## 2023-02-16 ENCOUNTER — TELEPHONE (OUTPATIENT)
Dept: OBGYN CLINIC | Age: 28
End: 2023-02-16

## 2023-02-16 NOTE — TELEPHONE ENCOUNTER
----- Message from María Shearer MD sent at 2/13/2023 11:06 AM EST -----  May offer [facility]

## 2023-03-01 ENCOUNTER — ROUTINE PRENATAL (OUTPATIENT)
Dept: OBGYN CLINIC | Age: 28
End: 2023-03-01

## 2023-03-01 VITALS
SYSTOLIC BLOOD PRESSURE: 124 MMHG | DIASTOLIC BLOOD PRESSURE: 68 MMHG | BODY MASS INDEX: 30.04 KG/M2 | HEART RATE: 84 BPM | WEIGHT: 159 LBS

## 2023-03-01 DIAGNOSIS — Z3A.16 16 WEEKS GESTATION OF PREGNANCY: ICD-10-CM

## 2023-03-01 DIAGNOSIS — Z34.02 ENCOUNTER FOR PRENATAL CARE OF FIRST PREGNANCY, SECOND TRIMESTER: Primary | ICD-10-CM

## 2023-03-01 NOTE — PROGRESS NOTES
Patient's last menstrual period was 11/08/2022 (exact date).   Please reference prenatal and OB flow chart for further information  PT here today for routine prenatal care  Pt endorses fetal movement and denies loss of fluid, contractions or vaginal bleeding  Pt without complaints  ROS:  Pt denies headache, dysuria, nausea/vomiting  PE:  /68   Pulse 84   Wt 159 lb (72.1 kg)   LMP 11/08/2022 (Exact Date)   BMI 30.04 kg/m²   Gen - Alert and oriented x 3  HEENT- NC/AT, CVS - RRR, Lungs - CTAB  Abd - FH below umb  Appropriate fetal growth  PN labs reviewed - ABO BPos, NIPT normal (XX)  +carrier screen, FOC testing offered, pt declines at this time  Pap NILM HR HPV positive  US for anatomy pending

## 2023-03-17 ENCOUNTER — HOSPITAL ENCOUNTER (OUTPATIENT)
Dept: ULTRASOUND IMAGING | Age: 28
End: 2023-03-17

## 2023-03-17 DIAGNOSIS — Z34.02 ENCOUNTER FOR PRENATAL CARE OF FIRST PREGNANCY, SECOND TRIMESTER: ICD-10-CM

## 2023-03-17 PROCEDURE — 76805 OB US >/= 14 WKS SNGL FETUS: CPT

## 2023-03-29 ENCOUNTER — ROUTINE PRENATAL (OUTPATIENT)
Dept: OBGYN CLINIC | Age: 28
End: 2023-03-29

## 2023-03-29 VITALS
BODY MASS INDEX: 31.55 KG/M2 | DIASTOLIC BLOOD PRESSURE: 60 MMHG | HEART RATE: 85 BPM | WEIGHT: 167 LBS | SYSTOLIC BLOOD PRESSURE: 112 MMHG

## 2023-03-29 DIAGNOSIS — Z34.02 ENCOUNTER FOR SUPERVISION OF NORMAL FIRST PREGNANCY, SECOND TRIMESTER: Primary | ICD-10-CM

## 2023-03-29 DIAGNOSIS — Z3A.20 20 WEEKS GESTATION OF PREGNANCY: ICD-10-CM

## 2023-03-29 PROCEDURE — 99212 OFFICE O/P EST SF 10 MIN: CPT | Performed by: OBSTETRICS & GYNECOLOGY

## 2023-03-29 NOTE — PROGRESS NOTES
Patient's last menstrual period was 11/08/2022 (exact date).   Please reference prenatal and OB flow chart for further information  PT here today for routine prenatal care  Pt endorses fetal movement and denies loss of fluid, contractions or vaginal bleeding  Pt without complaints  ROS:  Pt denies headache, dysuria, nausea/vomiting  PE:  /60   Pulse 85   Wt 167 lb (75.8 kg)   LMP 11/08/2022 (Exact Date)   BMI 31.55 kg/m²   Gen - Alert and oriented x 3  HEENT- NC/AT, CVS - RRR, Lungs - CTAB  Abd - FH @ umb  Appropriate fetal growth  US reviewed - repeat at 28-30wks

## 2023-05-01 ENCOUNTER — ROUTINE PRENATAL (OUTPATIENT)
Dept: OBGYN CLINIC | Age: 28
End: 2023-05-01
Payer: MEDICAID

## 2023-05-01 VITALS — SYSTOLIC BLOOD PRESSURE: 118 MMHG | DIASTOLIC BLOOD PRESSURE: 66 MMHG | WEIGHT: 174 LBS | BODY MASS INDEX: 32.88 KG/M2

## 2023-05-01 DIAGNOSIS — Z34.02 ENCOUNTER FOR PRENATAL CARE OF FIRST PREGNANCY, SECOND TRIMESTER: Primary | ICD-10-CM

## 2023-05-01 DIAGNOSIS — Z3A.24 24 WEEKS GESTATION OF PREGNANCY: ICD-10-CM

## 2023-05-01 PROCEDURE — 99213 OFFICE O/P EST LOW 20 MIN: CPT | Performed by: OBSTETRICS & GYNECOLOGY

## 2023-05-01 NOTE — PROGRESS NOTES
Patient's last menstrual period was 11/08/2022 (exact date).   Please reference prenatal and OB flow chart for further information  PT here today for routine prenatal care  Pt endorses fetal movement and denies loss of fluid, contractions or vaginal bleeding  Pt without complaints  ROS:  Pt denies headache, dysuria, nausea/vomiting  PE:  /66   Wt 174 lb (78.9 kg)   LMP 11/08/2022 (Exact Date)   BMI 32.88 kg/m²   Gen - Alert and oriented x 3  HEENT- NC/AT, CVS - RRR, Lungs - CTAB  Abd - FH 26  Appropriate fetal growth  1hr at next apt

## 2023-06-01 ENCOUNTER — ROUTINE PRENATAL (OUTPATIENT)
Dept: OBGYN CLINIC | Age: 28
End: 2023-06-01

## 2023-06-01 VITALS — BODY MASS INDEX: 34.2 KG/M2 | DIASTOLIC BLOOD PRESSURE: 68 MMHG | SYSTOLIC BLOOD PRESSURE: 120 MMHG | WEIGHT: 181 LBS

## 2023-06-01 DIAGNOSIS — Z3A.29 29 WEEKS GESTATION OF PREGNANCY: ICD-10-CM

## 2023-06-01 DIAGNOSIS — Z23 NEED FOR DIPHTHERIA-TETANUS-PERTUSSIS (TDAP) VACCINE: ICD-10-CM

## 2023-06-01 DIAGNOSIS — Z34.93 PRENATAL CARE IN THIRD TRIMESTER: Primary | ICD-10-CM

## 2023-06-01 PROCEDURE — 99212 OFFICE O/P EST SF 10 MIN: CPT | Performed by: OBSTETRICS & GYNECOLOGY

## 2023-06-01 PROCEDURE — 90471 IMMUNIZATION ADMIN: CPT | Performed by: OBSTETRICS & GYNECOLOGY

## 2023-06-01 PROCEDURE — 90715 TDAP VACCINE 7 YRS/> IM: CPT | Performed by: OBSTETRICS & GYNECOLOGY

## 2023-06-01 NOTE — PROGRESS NOTES
Patient's last menstrual period was 11/08/2022 (exact date).   Please reference prenatal and OB flow chart for further information  PT here today for routine prenatal care  Pt endorses fetal movement and denies loss of fluid, contractions or vaginal bleeding  Pt without complaints  ROS:  Pt denies headache, dysuria, nausea/vomiting  PE:  /68   Wt 181 lb (82.1 kg)   LMP 11/08/2022 (Exact Date)   BMI 34.20 kg/m²   Gen - Alert and oriented x 3  HEENT- NC/AT, CVS - RRR, Lungs - CTAB  Abd - FH 30  Appropriate fetal growth  1hr and US pending  Tdap today

## 2023-06-05 DIAGNOSIS — Z3A.24 24 WEEKS GESTATION OF PREGNANCY: ICD-10-CM

## 2023-06-05 DIAGNOSIS — Z34.02 ENCOUNTER FOR PRENATAL CARE OF FIRST PREGNANCY, SECOND TRIMESTER: ICD-10-CM

## 2023-06-05 LAB
GLUCOSE, 1HR PP: 131 MG/DL (ref 60–140)
HCT VFR BLD AUTO: 34.1 % (ref 37–47)
HGB BLD-MCNC: 11.6 G/DL (ref 12–16)

## 2023-06-19 ENCOUNTER — ROUTINE PRENATAL (OUTPATIENT)
Dept: OBGYN CLINIC | Age: 28
End: 2023-06-19
Payer: MEDICAID

## 2023-06-19 VITALS — SYSTOLIC BLOOD PRESSURE: 120 MMHG | DIASTOLIC BLOOD PRESSURE: 68 MMHG | WEIGHT: 180 LBS | BODY MASS INDEX: 34.01 KG/M2

## 2023-06-19 DIAGNOSIS — Z34.93 PRENATAL CARE IN THIRD TRIMESTER: Primary | ICD-10-CM

## 2023-06-19 DIAGNOSIS — Z3A.31 31 WEEKS GESTATION OF PREGNANCY: ICD-10-CM

## 2023-06-19 PROCEDURE — 99213 OFFICE O/P EST LOW 20 MIN: CPT | Performed by: OBSTETRICS & GYNECOLOGY

## 2023-06-19 NOTE — PROGRESS NOTES
Patient's last menstrual period was 11/08/2022 (exact date).   Please reference prenatal and OB flow chart for further information  PT here today for routine prenatal care  Pt endorses fetal movement and denies loss of fluid, contractions or vaginal bleeding  Pt without complaints  ROS:  Pt denies headache, dysuria, nausea/vomiting  PE:  /68   Wt 180 lb (81.6 kg)   LMP 11/08/2022 (Exact Date)   BMI 34.01 kg/m²   Gen - Alert and oriented x 3  HEENT- NC/AT, CVS - RRR, Lungs - CTAB  Abd - FH 34  Appropriate fetal growth  1hr 131, hgb 11.6  US pending

## 2023-06-23 ENCOUNTER — HOSPITAL ENCOUNTER (OUTPATIENT)
Dept: ULTRASOUND IMAGING | Age: 28
Discharge: HOME | End: 2023-06-23
Attending: OBSTETRICS & GYNECOLOGY
Payer: MEDICAID

## 2023-06-23 DIAGNOSIS — Z34.02 ENCOUNTER FOR PRENATAL CARE OF FIRST PREGNANCY, SECOND TRIMESTER: ICD-10-CM

## 2023-06-23 DIAGNOSIS — Z3A.24 24 WEEKS GESTATION OF PREGNANCY: ICD-10-CM

## 2023-06-23 PROCEDURE — 76815 OB US LIMITED FETUS(S): CPT

## 2023-07-03 ENCOUNTER — ROUTINE PRENATAL (OUTPATIENT)
Dept: OBGYN CLINIC | Age: 28
End: 2023-07-03
Payer: MEDICAID

## 2023-07-03 VITALS
BODY MASS INDEX: 34.96 KG/M2 | DIASTOLIC BLOOD PRESSURE: 72 MMHG | WEIGHT: 185 LBS | HEART RATE: 83 BPM | SYSTOLIC BLOOD PRESSURE: 114 MMHG

## 2023-07-03 DIAGNOSIS — Z34.93 PRENATAL CARE IN THIRD TRIMESTER: Primary | ICD-10-CM

## 2023-07-03 DIAGNOSIS — Z3A.33 33 WEEKS GESTATION OF PREGNANCY: ICD-10-CM

## 2023-07-03 PROCEDURE — 99213 OFFICE O/P EST LOW 20 MIN: CPT | Performed by: OBSTETRICS & GYNECOLOGY

## 2023-07-13 ENCOUNTER — HOSPITAL ENCOUNTER (OUTPATIENT)
Age: 28
Discharge: HOME | End: 2023-07-13
Attending: OBSTETRICS & GYNECOLOGY | Admitting: OBSTETRICS & GYNECOLOGY
Payer: MEDICAID

## 2023-07-13 VITALS
SYSTOLIC BLOOD PRESSURE: 103 MMHG | HEIGHT: 61 IN | OXYGEN SATURATION: 96 % | RESPIRATION RATE: 18 BRPM | DIASTOLIC BLOOD PRESSURE: 50 MMHG | WEIGHT: 186 LBS | HEART RATE: 80 BPM | TEMPERATURE: 98.3 F | BODY MASS INDEX: 35.12 KG/M2

## 2023-07-13 PROBLEM — O26.893 HEADACHE IN PREGNANCY, ANTEPARTUM, THIRD TRIMESTER: Status: ACTIVE | Noted: 2023-07-13

## 2023-07-13 PROBLEM — R51.9 HEADACHE IN PREGNANCY, ANTEPARTUM, THIRD TRIMESTER: Status: ACTIVE | Noted: 2023-07-13

## 2023-07-13 LAB
AMPHET UR QL SCN: NORMAL
BARBITURATES UR QL SCN: NORMAL
BENZODIAZ UR QL SCN: NORMAL
BILIRUB UR QL STRIP: NEGATIVE
CANNABINOIDS UR QL SCN: NORMAL
CLARITY UR: ABNORMAL
COCAINE UR QL SCN: NORMAL
COLOR UR: YELLOW
DRUG SCREEN COMMENT UR-IMP: NORMAL
FENTANYL SCREEN, URINE: NORMAL
GLUCOSE UR STRIP-MCNC: NEGATIVE MG/DL
HGB UR QL STRIP: NEGATIVE
KETONES UR STRIP-MCNC: NEGATIVE MG/DL
LEUKOCYTE ESTERASE UR QL STRIP: ABNORMAL
METHADONE UR QL SCN: NORMAL
MUCOUS THREADS URNS QL MICRO: PRESENT /LPF
NITRITE UR QL STRIP: NEGATIVE
OPIATES UR QL SCN: NORMAL
OXYCODONE UR QL SCN: NORMAL
PCP UR QL SCN: NORMAL
PH UR STRIP: 6.5 [PH] (ref 5–9)
PROPOXYPH UR QL SCN: NORMAL
PROT UR STRIP-MCNC: ABNORMAL MG/DL
SP GR UR STRIP: 1.02 (ref 1–1.03)
URINE REFLEX TO CULTURE: ABNORMAL
UROBILINOGEN UR STRIP-ACNC: 1 E.U./DL

## 2023-07-13 PROCEDURE — 59025 FETAL NON-STRESS TEST: CPT | Performed by: OBSTETRICS & GYNECOLOGY

## 2023-07-13 PROCEDURE — 81001 URINALYSIS AUTO W/SCOPE: CPT

## 2023-07-13 PROCEDURE — 99283 EMERGENCY DEPT VISIT LOW MDM: CPT

## 2023-07-13 PROCEDURE — 80307 DRUG TEST PRSMV CHEM ANLYZR: CPT

## 2023-07-13 PROCEDURE — 6370000000 HC RX 637 (ALT 250 FOR IP): Performed by: OBSTETRICS & GYNECOLOGY

## 2023-07-13 PROCEDURE — 99283 EMERGENCY DEPT VISIT LOW MDM: CPT | Performed by: OBSTETRICS & GYNECOLOGY

## 2023-07-13 RX ORDER — ACETAMINOPHEN 500 MG
1000 TABLET ORAL EVERY 4 HOURS PRN
Status: DISCONTINUED | OUTPATIENT
Start: 2023-07-13 | End: 2023-07-13 | Stop reason: HOSPADM

## 2023-07-13 RX ADMIN — ACETAMINOPHEN 1000 MG: 500 TABLET ORAL at 10:09

## 2023-07-13 ASSESSMENT — PAIN DESCRIPTION - DESCRIPTORS: DESCRIPTORS: ACHING

## 2023-07-13 ASSESSMENT — PAIN SCALES - GENERAL: PAINLEVEL_OUTOF10: 7

## 2023-07-13 NOTE — FLOWSHEET NOTE
Dr Guera Paiz notified of pt c/o HA, denies taking Tylenol, assessment, MD viewed EFM-RN to give Tylenol and MD to assess.

## 2023-07-13 NOTE — ED NOTES
Department of Obstetrics and Gynecology   Emergency Department, OB triage    Pt Name: La Rinaldi  MRN: [id number] #: [de-identified]  YOB: 1995  Estimated Date of Delivery: 8/15/23      HPI: The patient is a 33 y.o.  35w2d female who presents to [facility] triage for headache and increased bilateral feet swelling. PT did not take tylenol and she was concerned because she looked it up and thought she might have PIH.   +FM, -VB, -LOF, -CTX    Allergies: Allergies as of 2023    (No Known Allergies)       Medications:    Current Facility-Administered Medications:     acetaminophen (TYLENOL) tablet 1,000 mg, 1,000 mg, Oral, Q4H PRN, Lazaro Cramer MD, 1,000 mg at 23 1009    OB History:     Gyn History: Denies h/o abnormal pap smear, h/o STDs. Past Medical History:   Past Medical History:   Diagnosis Date    Anemia 2 months ago    Anxiety        Past Surgical History:   Past Surgical History:   Procedure Laterality Date    EAR SURGERY Right     TONSILLECTOMY      UPPER GASTROINTESTINAL ENDOSCOPY N/A 10/20/2022    EGD WITH BIOPSY performed by Cruz Joseph MD at [address] [place] [address] History:   Social History     Tobacco Use   Smoking Status Former    Types: E-Cigarettes   Smokeless Tobacco Never        Family History: Noncontributory; Denies h/o cancer. ROS:  Negative except as stated in HPI, denies nausea, vomiting, fever, chills, headache or dysuria. PE:  Vitals:    23 0942   BP: (!) 103/50   Pulse: 80   Resp:    Temp:    SpO2:        General: well nourished, well developed, in no acute distress  CV: Normal heart sounds  Resp: breathing unlabored  Abdomen: Nontender, no rebound, no guarding  FH: 34  Cx: deferred    NST : For threatened labor  FHR 140s, moderate variability, +accels, -decels  Ctx: none  Cat 1, reactive  Time for NST: approx 20-40min, please see NST strip for additional information    Labs:   Blood Type/Rh: B POS    Assessment:   33 y.o.

## 2023-07-17 ENCOUNTER — ROUTINE PRENATAL (OUTPATIENT)
Dept: OBGYN CLINIC | Age: 28
End: 2023-07-17
Payer: MEDICAID

## 2023-07-17 VITALS
WEIGHT: 187 LBS | HEART RATE: 96 BPM | DIASTOLIC BLOOD PRESSURE: 68 MMHG | SYSTOLIC BLOOD PRESSURE: 118 MMHG | BODY MASS INDEX: 35.33 KG/M2

## 2023-07-17 DIAGNOSIS — Z3A.35 35 WEEKS GESTATION OF PREGNANCY: ICD-10-CM

## 2023-07-17 DIAGNOSIS — Z34.93 PRENATAL CARE IN THIRD TRIMESTER: ICD-10-CM

## 2023-07-17 DIAGNOSIS — Z34.93 PRENATAL CARE IN THIRD TRIMESTER: Primary | ICD-10-CM

## 2023-07-17 PROCEDURE — 99213 OFFICE O/P EST LOW 20 MIN: CPT | Performed by: OBSTETRICS & GYNECOLOGY

## 2023-07-21 LAB — GP B STREP SPEC QL CULT: NORMAL

## 2023-07-24 ENCOUNTER — ROUTINE PRENATAL (OUTPATIENT)
Dept: OBGYN CLINIC | Age: 28
End: 2023-07-24
Payer: MEDICAID

## 2023-07-24 VITALS
SYSTOLIC BLOOD PRESSURE: 108 MMHG | BODY MASS INDEX: 35.14 KG/M2 | HEART RATE: 80 BPM | DIASTOLIC BLOOD PRESSURE: 70 MMHG | WEIGHT: 186 LBS

## 2023-07-24 DIAGNOSIS — Z3A.36 36 WEEKS GESTATION OF PREGNANCY: ICD-10-CM

## 2023-07-24 DIAGNOSIS — Z34.93 PRENATAL CARE IN THIRD TRIMESTER: Primary | ICD-10-CM

## 2023-07-24 PROCEDURE — 99212 OFFICE O/P EST SF 10 MIN: CPT | Performed by: OBSTETRICS & GYNECOLOGY

## 2023-07-31 ENCOUNTER — ROUTINE PRENATAL (OUTPATIENT)
Dept: OBGYN CLINIC | Age: 28
End: 2023-07-31
Payer: MEDICAID

## 2023-07-31 VITALS
DIASTOLIC BLOOD PRESSURE: 82 MMHG | BODY MASS INDEX: 35.9 KG/M2 | SYSTOLIC BLOOD PRESSURE: 120 MMHG | WEIGHT: 190 LBS | HEART RATE: 97 BPM

## 2023-07-31 DIAGNOSIS — Z34.93 PRENATAL CARE IN THIRD TRIMESTER: Primary | ICD-10-CM

## 2023-07-31 DIAGNOSIS — Z3A.37 37 WEEKS GESTATION OF PREGNANCY: ICD-10-CM

## 2023-07-31 PROCEDURE — 99212 OFFICE O/P EST SF 10 MIN: CPT | Performed by: OBSTETRICS & GYNECOLOGY

## 2023-08-01 ENCOUNTER — ANESTHESIA (OUTPATIENT)
Dept: LABOR AND DELIVERY | Age: 28
End: 2023-08-01
Payer: MEDICAID

## 2023-08-01 ENCOUNTER — ANESTHESIA EVENT (OUTPATIENT)
Dept: LABOR AND DELIVERY | Age: 28
End: 2023-08-01
Payer: MEDICAID

## 2023-08-01 ENCOUNTER — HOSPITAL ENCOUNTER (INPATIENT)
Age: 28
LOS: 3 days | Discharge: HOME | End: 2023-08-04
Attending: OBSTETRICS & GYNECOLOGY | Admitting: OBSTETRICS & GYNECOLOGY
Payer: MEDICAID

## 2023-08-01 PROBLEM — Z78.9 ADMITTED TO LABOR AND DELIVERY: Status: ACTIVE | Noted: 2023-08-01

## 2023-08-01 LAB
ABO + RH BLD: NORMAL
ALBUMIN SERPL-MCNC: 3.6 G/DL (ref 3.5–4.6)
ALP SERPL-CCNC: 142 U/L (ref 40–130)
ALT SERPL-CCNC: 10 U/L (ref 0–33)
AMPHET UR QL SCN: NORMAL
ANION GAP SERPL CALCULATED.3IONS-SCNC: 12 MEQ/L (ref 9–15)
AST SERPL-CCNC: 17 U/L (ref 0–35)
BACTERIA URNS QL MICRO: NEGATIVE /HPF
BARBITURATES UR QL SCN: NORMAL
BASOPHILS # BLD: 0.1 K/UL (ref 0–0.2)
BASOPHILS NFR BLD: 0.3 %
BENZODIAZ UR QL SCN: NORMAL
BILIRUB SERPL-MCNC: <0.2 MG/DL (ref 0.2–0.7)
BILIRUB UR QL STRIP: NEGATIVE
BLD GP AB SCN SERPL QL: NORMAL
BUN SERPL-MCNC: 6 MG/DL (ref 6–20)
CALCIUM SERPL-MCNC: 9.1 MG/DL (ref 8.5–9.9)
CANNABINOIDS UR QL SCN: NORMAL
CHLORIDE SERPL-SCNC: 108 MEQ/L (ref 95–107)
CLARITY UR: ABNORMAL
CO2 SERPL-SCNC: 19 MEQ/L (ref 20–31)
COCAINE UR QL SCN: NORMAL
COLOR UR: ABNORMAL
CREAT SERPL-MCNC: 0.52 MG/DL (ref 0.5–0.9)
DRUG SCREEN COMMENT UR-IMP: NORMAL
EOSINOPHIL # BLD: 0.1 K/UL (ref 0–0.7)
EOSINOPHIL NFR BLD: 0.3 %
EPI CELLS #/AREA URNS AUTO: ABNORMAL /HPF (ref 0–5)
ERYTHROCYTE [DISTWIDTH] IN BLOOD BY AUTOMATED COUNT: 13.5 % (ref 11.5–14.5)
FENTANYL SCREEN, URINE: NORMAL
GLOBULIN SER CALC-MCNC: 2.8 G/DL (ref 2.3–3.5)
GLUCOSE SERPL-MCNC: 101 MG/DL (ref 70–99)
GLUCOSE UR STRIP-MCNC: NEGATIVE MG/DL
HBV SURFACE AG SERPL QL IA: NORMAL
HCT VFR BLD AUTO: 31.7 % (ref 37–47)
HGB BLD-MCNC: 10.7 G/DL (ref 12–16)
HGB UR QL STRIP: ABNORMAL
KETONES UR STRIP-MCNC: NEGATIVE MG/DL
LEUKOCYTE ESTERASE UR QL STRIP: ABNORMAL
LYMPHOCYTES # BLD: 2.5 K/UL (ref 1–4.8)
LYMPHOCYTES NFR BLD: 15.1 %
MCH RBC QN AUTO: 29.4 PG (ref 27–31.3)
MCHC RBC AUTO-ENTMCNC: 33.7 % (ref 33–37)
MCV RBC AUTO: 87.2 FL (ref 79.4–94.8)
METHADONE UR QL SCN: NORMAL
MONOCYTES # BLD: 0.8 K/UL (ref 0.2–0.8)
MONOCYTES NFR BLD: 4.8 %
NEUTROPHILS # BLD: 13.3 K/UL (ref 1.4–6.5)
NEUTS SEG NFR BLD: 79.5 %
NITRITE UR QL STRIP: NEGATIVE
OPIATES UR QL SCN: NORMAL
OXYCODONE UR QL SCN: NORMAL
PCP UR QL SCN: NORMAL
PH UR STRIP: 6 [PH] (ref 5–9)
PLACENTA ALPHA MICROGLOBULIN-1: POSITIVE
PLATELET # BLD AUTO: 319 K/UL (ref 130–400)
POTASSIUM SERPL-SCNC: 3.8 MEQ/L (ref 3.4–4.9)
PROPOXYPH UR QL SCN: NORMAL
PROT SERPL-MCNC: 6.4 G/DL (ref 6.3–8)
PROT UR STRIP-MCNC: 30 MG/DL
RBC # BLD AUTO: 3.64 M/UL (ref 4.2–5.4)
RBC #/AREA URNS AUTO: >100 /HPF (ref 0–5)
RPR SER QL: NORMAL
SODIUM SERPL-SCNC: 139 MEQ/L (ref 135–144)
SP GR UR STRIP: 1.02 (ref 1–1.03)
UROBILINOGEN UR STRIP-ACNC: 0.2 E.U./DL
WBC # BLD AUTO: 16.7 K/UL (ref 4.8–10.8)
WBC #/AREA URNS AUTO: >100 /HPF (ref 0–5)

## 2023-08-01 PROCEDURE — 7100000001 HC PACU RECOVERY - ADDTL 15 MIN: Performed by: OBSTETRICS & GYNECOLOGY

## 2023-08-01 PROCEDURE — 6360000002 HC RX W HCPCS: Performed by: OBSTETRICS & GYNECOLOGY

## 2023-08-01 PROCEDURE — 6360000002 HC RX W HCPCS: Performed by: NURSE ANESTHETIST, CERTIFIED REGISTERED

## 2023-08-01 PROCEDURE — 3609079900 HC CESAREAN SECTION: Performed by: OBSTETRICS & GYNECOLOGY

## 2023-08-01 PROCEDURE — 2500000003 HC RX 250 WO HCPCS: Performed by: OBSTETRICS & GYNECOLOGY

## 2023-08-01 PROCEDURE — 6370000000 HC RX 637 (ALT 250 FOR IP): Performed by: OBSTETRICS & GYNECOLOGY

## 2023-08-01 PROCEDURE — 59514 CESAREAN DELIVERY ONLY: CPT | Performed by: OBSTETRICS & GYNECOLOGY

## 2023-08-01 PROCEDURE — 7100000000 HC PACU RECOVERY - FIRST 15 MIN: Performed by: OBSTETRICS & GYNECOLOGY

## 2023-08-01 PROCEDURE — 85025 COMPLETE CBC W/AUTO DIFF WBC: CPT

## 2023-08-01 PROCEDURE — 80053 COMPREHEN METABOLIC PANEL: CPT

## 2023-08-01 PROCEDURE — 2709999900 HC NON-CHARGEABLE SUPPLY: Performed by: OBSTETRICS & GYNECOLOGY

## 2023-08-01 PROCEDURE — 1220000000 HC SEMI PRIVATE OB R&B

## 2023-08-01 PROCEDURE — 3700000025 EPIDURAL BLOCK: Performed by: NURSE ANESTHETIST, CERTIFIED REGISTERED

## 2023-08-01 PROCEDURE — 80307 DRUG TEST PRSMV CHEM ANLYZR: CPT

## 2023-08-01 PROCEDURE — 84112 EVAL AMNIOTIC FLUID PROTEIN: CPT

## 2023-08-01 PROCEDURE — 2580000003 HC RX 258: Performed by: OBSTETRICS & GYNECOLOGY

## 2023-08-01 PROCEDURE — A4216 STERILE WATER/SALINE, 10 ML: HCPCS | Performed by: OBSTETRICS & GYNECOLOGY

## 2023-08-01 PROCEDURE — 86850 RBC ANTIBODY SCREEN: CPT

## 2023-08-01 PROCEDURE — 86901 BLOOD TYPING SEROLOGIC RH(D): CPT

## 2023-08-01 PROCEDURE — 88307 TISSUE EXAM BY PATHOLOGIST: CPT

## 2023-08-01 PROCEDURE — 3700000000 HC ANESTHESIA ATTENDED CARE: Performed by: OBSTETRICS & GYNECOLOGY

## 2023-08-01 PROCEDURE — 86900 BLOOD TYPING SEROLOGIC ABO: CPT

## 2023-08-01 PROCEDURE — 86592 SYPHILIS TEST NON-TREP QUAL: CPT

## 2023-08-01 PROCEDURE — 3700000001 HC ADD 15 MINUTES (ANESTHESIA): Performed by: OBSTETRICS & GYNECOLOGY

## 2023-08-01 PROCEDURE — 59025 FETAL NON-STRESS TEST: CPT | Performed by: OBSTETRICS & GYNECOLOGY

## 2023-08-01 PROCEDURE — 87340 HEPATITIS B SURFACE AG IA: CPT

## 2023-08-01 PROCEDURE — 81001 URINALYSIS AUTO W/SCOPE: CPT

## 2023-08-01 RX ORDER — OXYCODONE HYDROCHLORIDE AND ACETAMINOPHEN 5; 325 MG/1; MG/1
1 TABLET ORAL EVERY 6 HOURS PRN
Qty: 20 TABLET | Refills: 0 | Status: SHIPPED | OUTPATIENT
Start: 2023-08-01 | End: 2023-08-06

## 2023-08-01 RX ORDER — FENTANYL CITRATE 50 UG/ML
INJECTION, SOLUTION INTRAMUSCULAR; INTRAVENOUS
Status: COMPLETED | OUTPATIENT
Start: 2023-08-01 | End: 2023-08-01

## 2023-08-01 RX ORDER — OXYCODONE HYDROCHLORIDE AND ACETAMINOPHEN 5; 325 MG/1; MG/1
1 TABLET ORAL EVERY 4 HOURS PRN
Status: DISCONTINUED | OUTPATIENT
Start: 2023-08-01 | End: 2023-08-04 | Stop reason: HOSPADM

## 2023-08-01 RX ORDER — KETOROLAC TROMETHAMINE 30 MG/ML
INJECTION, SOLUTION INTRAMUSCULAR; INTRAVENOUS PRN
Status: DISCONTINUED | OUTPATIENT
Start: 2023-08-01 | End: 2023-08-01 | Stop reason: SDUPTHER

## 2023-08-01 RX ORDER — DOCUSATE SODIUM 100 MG/1
100 CAPSULE, LIQUID FILLED ORAL DAILY
Status: DISCONTINUED | OUTPATIENT
Start: 2023-08-01 | End: 2023-08-04 | Stop reason: HOSPADM

## 2023-08-01 RX ORDER — BUPIVACAINE HYDROCHLORIDE 7.5 MG/ML
INJECTION, SOLUTION INTRASPINAL
Status: COMPLETED | OUTPATIENT
Start: 2023-08-01 | End: 2023-08-01

## 2023-08-01 RX ORDER — BUPIVACAINE HYDROCHLORIDE 2.5 MG/ML
INJECTION, SOLUTION EPIDURAL; INFILTRATION; INTRACAUDAL PRN
Status: DISCONTINUED | OUTPATIENT
Start: 2023-08-01 | End: 2023-08-01 | Stop reason: SDUPTHER

## 2023-08-01 RX ORDER — OXYTOCIN 10 [USP'U]/ML
INJECTION, SOLUTION INTRAMUSCULAR; INTRAVENOUS
Status: DISCONTINUED
Start: 2023-08-01 | End: 2023-08-01 | Stop reason: WASHOUT

## 2023-08-01 RX ORDER — FENTANYL CITRATE 50 UG/ML
INJECTION, SOLUTION INTRAMUSCULAR; INTRAVENOUS
Status: COMPLETED
Start: 2023-08-01 | End: 2023-08-01

## 2023-08-01 RX ORDER — SODIUM CHLORIDE, SODIUM LACTATE, POTASSIUM CHLORIDE, CALCIUM CHLORIDE 600; 310; 30; 20 MG/100ML; MG/100ML; MG/100ML; MG/100ML
INJECTION, SOLUTION INTRAVENOUS CONTINUOUS
Status: DISCONTINUED | OUTPATIENT
Start: 2023-08-01 | End: 2023-08-04 | Stop reason: HOSPADM

## 2023-08-01 RX ORDER — TRISODIUM CITRATE DIHYDRATE AND CITRIC ACID MONOHYDRATE 500; 334 MG/5ML; MG/5ML
30 SOLUTION ORAL ONCE
Status: COMPLETED | OUTPATIENT
Start: 2023-08-01 | End: 2023-08-01

## 2023-08-01 RX ORDER — CARBOPROST TROMETHAMINE 250 UG/ML
250 INJECTION, SOLUTION INTRAMUSCULAR PRN
Status: DISCONTINUED | OUTPATIENT
Start: 2023-08-01 | End: 2023-08-01

## 2023-08-01 RX ORDER — TRANEXAMIC ACID 100 MG/ML
INJECTION, SOLUTION INTRAVENOUS
Status: DISCONTINUED
Start: 2023-08-01 | End: 2023-08-01 | Stop reason: WASHOUT

## 2023-08-01 RX ORDER — SODIUM CHLORIDE 0.9 % (FLUSH) 0.9 %
5-40 SYRINGE (ML) INJECTION PRN
Status: DISCONTINUED | OUTPATIENT
Start: 2023-08-01 | End: 2023-08-04 | Stop reason: HOSPADM

## 2023-08-01 RX ORDER — DOCUSATE SODIUM 100 MG/1
100 CAPSULE, LIQUID FILLED ORAL 2 TIMES DAILY PRN
Status: DISCONTINUED | OUTPATIENT
Start: 2023-08-01 | End: 2023-08-01

## 2023-08-01 RX ORDER — DEXAMETHASONE SODIUM PHOSPHATE 10 MG/ML
INJECTION, SOLUTION INTRAMUSCULAR; INTRAVENOUS PRN
Status: DISCONTINUED | OUTPATIENT
Start: 2023-08-01 | End: 2023-08-01 | Stop reason: SDUPTHER

## 2023-08-01 RX ORDER — HYDROMORPHONE HYDROCHLORIDE 1 MG/ML
0.5 INJECTION, SOLUTION INTRAMUSCULAR; INTRAVENOUS; SUBCUTANEOUS
Status: DISCONTINUED | OUTPATIENT
Start: 2023-08-01 | End: 2023-08-04 | Stop reason: HOSPADM

## 2023-08-01 RX ORDER — SCOLOPAMINE TRANSDERMAL SYSTEM 1 MG/1
1 PATCH, EXTENDED RELEASE TRANSDERMAL
Status: DISCONTINUED | OUTPATIENT
Start: 2023-08-01 | End: 2023-08-01

## 2023-08-01 RX ORDER — METHYLERGONOVINE MALEATE 0.2 MG/ML
INJECTION INTRAVENOUS
Status: DISCONTINUED
Start: 2023-08-01 | End: 2023-08-01

## 2023-08-01 RX ORDER — METHYLERGONOVINE MALEATE 0.2 MG/ML
200 INJECTION INTRAVENOUS PRN
Status: DISCONTINUED | OUTPATIENT
Start: 2023-08-01 | End: 2023-08-01

## 2023-08-01 RX ORDER — SODIUM CHLORIDE 9 MG/ML
INJECTION, SOLUTION INTRAVENOUS PRN
Status: DISCONTINUED | OUTPATIENT
Start: 2023-08-01 | End: 2023-08-01

## 2023-08-01 RX ORDER — SODIUM CHLORIDE, SODIUM LACTATE, POTASSIUM CHLORIDE, AND CALCIUM CHLORIDE .6; .31; .03; .02 G/100ML; G/100ML; G/100ML; G/100ML
1000 INJECTION, SOLUTION INTRAVENOUS ONCE
Status: DISCONTINUED | OUTPATIENT
Start: 2023-08-01 | End: 2023-08-01

## 2023-08-01 RX ORDER — MISOPROSTOL 200 UG/1
400 TABLET ORAL PRN
Status: DISCONTINUED | OUTPATIENT
Start: 2023-08-01 | End: 2023-08-01

## 2023-08-01 RX ORDER — SODIUM CHLORIDE 0.9 % (FLUSH) 0.9 %
5-40 SYRINGE (ML) INJECTION EVERY 12 HOURS SCHEDULED
Status: DISCONTINUED | OUTPATIENT
Start: 2023-08-01 | End: 2023-08-04 | Stop reason: HOSPADM

## 2023-08-01 RX ORDER — DIPHENHYDRAMINE HYDROCHLORIDE 50 MG/ML
25 INJECTION INTRAMUSCULAR; INTRAVENOUS EVERY 6 HOURS PRN
Status: DISCONTINUED | OUTPATIENT
Start: 2023-08-01 | End: 2023-08-04 | Stop reason: HOSPADM

## 2023-08-01 RX ORDER — ACETAMINOPHEN 325 MG/1
650 TABLET ORAL EVERY 4 HOURS PRN
Status: DISCONTINUED | OUTPATIENT
Start: 2023-08-01 | End: 2023-08-01

## 2023-08-01 RX ORDER — IBUPROFEN 600 MG/1
600 TABLET ORAL EVERY 6 HOURS PRN
Qty: 120 TABLET | Refills: 3 | Status: SHIPPED | OUTPATIENT
Start: 2023-08-01

## 2023-08-01 RX ORDER — SODIUM CHLORIDE 9 MG/ML
INJECTION, SOLUTION INTRAVENOUS PRN
Status: DISCONTINUED | OUTPATIENT
Start: 2023-08-01 | End: 2023-08-04 | Stop reason: HOSPADM

## 2023-08-01 RX ORDER — DIPHENHYDRAMINE HCL 25 MG
25 TABLET ORAL EVERY 4 HOURS PRN
Status: DISCONTINUED | OUTPATIENT
Start: 2023-08-01 | End: 2023-08-01

## 2023-08-01 RX ORDER — SODIUM CHLORIDE, SODIUM LACTATE, POTASSIUM CHLORIDE, AND CALCIUM CHLORIDE .6; .31; .03; .02 G/100ML; G/100ML; G/100ML; G/100ML
1000 INJECTION, SOLUTION INTRAVENOUS PRN
Status: DISCONTINUED | OUTPATIENT
Start: 2023-08-01 | End: 2023-08-01

## 2023-08-01 RX ORDER — SODIUM CHLORIDE, SODIUM LACTATE, POTASSIUM CHLORIDE, CALCIUM CHLORIDE 600; 310; 30; 20 MG/100ML; MG/100ML; MG/100ML; MG/100ML
INJECTION, SOLUTION INTRAVENOUS CONTINUOUS
Status: DISCONTINUED | OUTPATIENT
Start: 2023-08-01 | End: 2023-08-01

## 2023-08-01 RX ORDER — MISOPROSTOL 200 UG/1
TABLET ORAL
Status: DISCONTINUED
Start: 2023-08-01 | End: 2023-08-01 | Stop reason: WASHOUT

## 2023-08-01 RX ORDER — SODIUM CHLORIDE 0.9 % (FLUSH) 0.9 %
5-40 SYRINGE (ML) INJECTION EVERY 12 HOURS SCHEDULED
Status: DISCONTINUED | OUTPATIENT
Start: 2023-08-01 | End: 2023-08-01

## 2023-08-01 RX ORDER — SODIUM CHLORIDE, SODIUM LACTATE, POTASSIUM CHLORIDE, AND CALCIUM CHLORIDE .6; .31; .03; .02 G/100ML; G/100ML; G/100ML; G/100ML
500 INJECTION, SOLUTION INTRAVENOUS PRN
Status: DISCONTINUED | OUTPATIENT
Start: 2023-08-01 | End: 2023-08-01

## 2023-08-01 RX ORDER — ACETAMINOPHEN 500 MG
1000 TABLET ORAL EVERY 8 HOURS PRN
Status: DISCONTINUED | OUTPATIENT
Start: 2023-08-01 | End: 2023-08-04 | Stop reason: HOSPADM

## 2023-08-01 RX ORDER — OXYCODONE HYDROCHLORIDE AND ACETAMINOPHEN 5; 325 MG/1; MG/1
2 TABLET ORAL ONCE
Status: DISCONTINUED | OUTPATIENT
Start: 2023-08-01 | End: 2023-08-04 | Stop reason: HOSPADM

## 2023-08-01 RX ORDER — CARBOPROST TROMETHAMINE 250 UG/ML
INJECTION, SOLUTION INTRAMUSCULAR
Status: DISCONTINUED
Start: 2023-08-01 | End: 2023-08-01 | Stop reason: WASHOUT

## 2023-08-01 RX ORDER — NALBUPHINE HYDROCHLORIDE 20 MG/ML
5 INJECTION, SOLUTION INTRAMUSCULAR; INTRAVENOUS; SUBCUTANEOUS
Status: DISCONTINUED | OUTPATIENT
Start: 2023-08-01 | End: 2023-08-01

## 2023-08-01 RX ORDER — SODIUM CHLORIDE 0.9 % (FLUSH) 0.9 %
10 SYRINGE (ML) INJECTION EVERY 12 HOURS SCHEDULED
Status: DISCONTINUED | OUTPATIENT
Start: 2023-08-01 | End: 2023-08-01

## 2023-08-01 RX ORDER — ONDANSETRON 2 MG/ML
INJECTION INTRAMUSCULAR; INTRAVENOUS PRN
Status: DISCONTINUED | OUTPATIENT
Start: 2023-08-01 | End: 2023-08-01 | Stop reason: SDUPTHER

## 2023-08-01 RX ORDER — MODIFIED LANOLIN
OINTMENT (GRAM) TOPICAL
Status: DISCONTINUED | OUTPATIENT
Start: 2023-08-01 | End: 2023-08-04 | Stop reason: HOSPADM

## 2023-08-01 RX ORDER — NALOXONE HYDROCHLORIDE 0.4 MG/ML
INJECTION, SOLUTION INTRAMUSCULAR; INTRAVENOUS; SUBCUTANEOUS PRN
Status: DISCONTINUED | OUTPATIENT
Start: 2023-08-01 | End: 2023-08-01

## 2023-08-01 RX ORDER — SODIUM CHLORIDE 0.9 % (FLUSH) 0.9 %
10 SYRINGE (ML) INJECTION PRN
Status: DISCONTINUED | OUTPATIENT
Start: 2023-08-01 | End: 2023-08-01

## 2023-08-01 RX ORDER — PRENATAL VIT/IRON FUM/FOLIC AC 27MG-0.8MG
1 TABLET ORAL DAILY
Status: DISCONTINUED | OUTPATIENT
Start: 2023-08-01 | End: 2023-08-04 | Stop reason: HOSPADM

## 2023-08-01 RX ORDER — ONDANSETRON 2 MG/ML
4 INJECTION INTRAMUSCULAR; INTRAVENOUS EVERY 6 HOURS PRN
Status: DISCONTINUED | OUTPATIENT
Start: 2023-08-01 | End: 2023-08-01

## 2023-08-01 RX ORDER — MORPHINE SULFATE 1 MG/ML
INJECTION, SOLUTION EPIDURAL; INTRATHECAL; INTRAVENOUS
Status: COMPLETED | OUTPATIENT
Start: 2023-08-01 | End: 2023-08-01

## 2023-08-01 RX ORDER — SODIUM CHLORIDE 0.9 % (FLUSH) 0.9 %
5-40 SYRINGE (ML) INJECTION PRN
Status: DISCONTINUED | OUTPATIENT
Start: 2023-08-01 | End: 2023-08-01

## 2023-08-01 RX ORDER — IBUPROFEN 600 MG/1
600 TABLET ORAL EVERY 6 HOURS PRN
Status: DISCONTINUED | OUTPATIENT
Start: 2023-08-01 | End: 2023-08-04 | Stop reason: HOSPADM

## 2023-08-01 RX ORDER — KETOROLAC TROMETHAMINE 30 MG/ML
30 INJECTION, SOLUTION INTRAMUSCULAR; INTRAVENOUS EVERY 6 HOURS
Status: DISCONTINUED | OUTPATIENT
Start: 2023-08-01 | End: 2023-08-04 | Stop reason: HOSPADM

## 2023-08-01 RX ORDER — FENTANYL CITRATE 50 UG/ML
INJECTION, SOLUTION INTRAMUSCULAR; INTRAVENOUS PRN
Status: DISCONTINUED | OUTPATIENT
Start: 2023-08-01 | End: 2023-08-01 | Stop reason: SDUPTHER

## 2023-08-01 RX ORDER — ONDANSETRON 2 MG/ML
4 INJECTION INTRAMUSCULAR; INTRAVENOUS EVERY 6 HOURS PRN
Status: DISCONTINUED | OUTPATIENT
Start: 2023-08-01 | End: 2023-08-04 | Stop reason: HOSPADM

## 2023-08-01 RX ORDER — SODIUM CHLORIDE 9 MG/ML
INJECTION, SOLUTION INTRAVENOUS
Status: DISCONTINUED
Start: 2023-08-01 | End: 2023-08-01 | Stop reason: WASHOUT

## 2023-08-01 RX ADMIN — ACETAMINOPHEN 1000 MG: 500 TABLET ORAL at 17:10

## 2023-08-01 RX ADMIN — FENTANYL CITRATE 15 MCG: 50 INJECTION, SOLUTION INTRAMUSCULAR; INTRAVENOUS at 15:16

## 2023-08-01 RX ADMIN — FENTANYL CITRATE 100 MCG: 50 INJECTION, SOLUTION INTRAMUSCULAR; INTRAVENOUS at 12:40

## 2023-08-01 RX ADMIN — SODIUM CITRATE AND CITRIC ACID MONOHYDRATE 30 ML: 500; 334 SOLUTION ORAL at 14:45

## 2023-08-01 RX ADMIN — KETOROLAC TROMETHAMINE 30 MG: 30 INJECTION, SOLUTION INTRAMUSCULAR at 15:47

## 2023-08-01 RX ADMIN — Medication 87.3 MILLI-UNITS/MIN: at 16:09

## 2023-08-01 RX ADMIN — KETOROLAC TROMETHAMINE 30 MG: 30 INJECTION, SOLUTION INTRAMUSCULAR; INTRAVENOUS at 22:04

## 2023-08-01 RX ADMIN — FAMOTIDINE 20 MG: 10 INJECTION, SOLUTION INTRAVENOUS at 14:45

## 2023-08-01 RX ADMIN — MORPHINE SULFATE 0.2 MG: 1 INJECTION EPIDURAL; INTRATHECAL; INTRAVENOUS at 15:16

## 2023-08-01 RX ADMIN — SODIUM CHLORIDE, POTASSIUM CHLORIDE, SODIUM LACTATE AND CALCIUM CHLORIDE: 600; 310; 30; 20 INJECTION, SOLUTION INTRAVENOUS at 08:44

## 2023-08-01 RX ADMIN — SODIUM CHLORIDE, POTASSIUM CHLORIDE, SODIUM LACTATE AND CALCIUM CHLORIDE: 600; 310; 30; 20 INJECTION, SOLUTION INTRAVENOUS at 06:39

## 2023-08-01 RX ADMIN — Medication 1 MILLI-UNITS/MIN: at 12:27

## 2023-08-01 RX ADMIN — SODIUM CHLORIDE, POTASSIUM CHLORIDE, SODIUM LACTATE AND CALCIUM CHLORIDE: 600; 310; 30; 20 INJECTION, SOLUTION INTRAVENOUS at 13:43

## 2023-08-01 RX ADMIN — CEFAZOLIN 2000 MG: 2 INJECTION, POWDER, FOR SOLUTION INTRAMUSCULAR; INTRAVENOUS at 07:03

## 2023-08-01 RX ADMIN — Medication 6 ML: at 06:58

## 2023-08-01 RX ADMIN — Medication 11 ML/HR: at 06:59

## 2023-08-01 RX ADMIN — PHENYLEPHRINE HYDROCHLORIDE 100 MCG: 10 INJECTION INTRAVENOUS at 15:39

## 2023-08-01 RX ADMIN — ONDANSETRON 4 MG: 2 INJECTION INTRAMUSCULAR; INTRAVENOUS at 15:30

## 2023-08-01 RX ADMIN — SODIUM CHLORIDE, POTASSIUM CHLORIDE, SODIUM LACTATE AND CALCIUM CHLORIDE 1000 ML: 600; 310; 30; 20 INJECTION, SOLUTION INTRAVENOUS at 06:00

## 2023-08-01 RX ADMIN — BUPIVACAINE HYDROCHLORIDE 2 ML: 2.5 INJECTION, SOLUTION EPIDURAL; INFILTRATION; INTRACAUDAL at 12:40

## 2023-08-01 RX ADMIN — Medication 180 ML: at 15:29

## 2023-08-01 RX ADMIN — BUPIVACAINE HYDROCHLORIDE 2 ML: 2.5 INJECTION, SOLUTION EPIDURAL; INFILTRATION; INTRACAUDAL at 09:45

## 2023-08-01 RX ADMIN — Medication 1 MILLI-UNITS/MIN: at 08:11

## 2023-08-01 RX ADMIN — BUPIVACAINE HYDROCHLORIDE 11.25 MG: 7.5 INJECTION, SOLUTION INTRASPINAL at 15:16

## 2023-08-01 RX ADMIN — FENTANYL CITRATE 100 MCG: 50 INJECTION, SOLUTION INTRAMUSCULAR; INTRAVENOUS at 09:45

## 2023-08-01 RX ADMIN — DEXAMETHASONE SODIUM PHOSPHATE 10 MG: 10 INJECTION, SOLUTION INTRAMUSCULAR; INTRAVENOUS at 15:22

## 2023-08-01 ASSESSMENT — PAIN DESCRIPTION - DESCRIPTORS: DESCRIPTORS: DISCOMFORT;SORE

## 2023-08-01 ASSESSMENT — PAIN DESCRIPTION - LOCATION: LOCATION: INCISION

## 2023-08-01 ASSESSMENT — PAIN SCALES - GENERAL: PAINLEVEL_OUTOF10: 1

## 2023-08-01 NOTE — FLOWSHEET NOTE
5144 CRNA bedside  0649 timeout  0650 start  0654 test dose  0658 bolus and laying down  Difficulty tracing infant d/t mother sitting for epidural

## 2023-08-01 NOTE — FLOWSHEET NOTE
RN called to bedside. Pt stated she thinks she displaced her epidural repositioning in bed. Epidural assessed. Plan to call CRNA.

## 2023-08-01 NOTE — FLOWSHEET NOTE
Call to Dr. Jillian Singh. Update given on epidural being unsuccessful. Requested bedside by pt. Stated she will be right out.     1425 Dr. Jillian Singh bedside. Speaking to pt regarding plan of care SVE performed. Unchanged. Discussing options with pt. Plan to prep pt for c/s at this time. Orders received. 1421 call to CRNA. Made aware of plan for c/s.

## 2023-08-01 NOTE — FLOWSHEET NOTE
Dr. Woo Clark bedside talking to patient about change in dilation. Physician states she will be back in a couple of minutes to reassess.

## 2023-08-01 NOTE — FLOWSHEET NOTE
CRNA bedside. Speaking with pt regarding plan for new epidural placement. Preparing for epidural placement. 1356 sitting for epidural. Epidural timeout. Multiple epidural attempts unsuccessful. Pt stated she does not want to continue with epidural at this time. Pt verbalized difficulty with positioning and vaginal pressure at this time.

## 2023-08-01 NOTE — FLOWSHEET NOTE
Dr. Mendel Malagon at desk. Update given on pts status. EFM strip reviewed. Plan to start pitocin at this time. Orders received.

## 2023-08-01 NOTE — H&P
Department of Obstetrics and Gynecology   History & Physical    Pt Name: Maite Gage  MRN: [id number] #: [de-identified]  YOB: 1995  Estimated Date of Delivery: 8/15/23      HPI: The patient is a 33 y.o.  42w0d female who presents to [facility] triage for CTX and ROM.   +FM, -VB, +LOF, +CTX    Allergies:     Allergies as of 2023    (No Known Allergies)       Medications:    Current Facility-Administered Medications:     oxytocin (PITOCIN) 30 units in 500 mL infusion, 1 cyn-units/min, IntraVENous, Continuous, Gilberto White MD    lactated ringers IV soln infusion, , IntraVENous, Continuous, Gilberto White MD, Last Rate: 999 mL/hr at 23 0639, New Bag at 23 5395    lactated ringers bolus bolus 500 mL, 500 mL, IntraVENous, PRN **OR** lactated ringers bolus bolus 1,000 mL, 1,000 mL, IntraVENous, PRN, Gilberto White MD, Last Rate: 999 mL/hr at 23 0600, 1,000 mL at 23 0600    sodium chloride flush 0.9 % injection 5-40 mL, 5-40 mL, IntraVENous, 2 times per day, Gilberto White MD    sodium chloride flush 0.9 % injection 5-40 mL, 5-40 mL, IntraVENous, PRN, Alejandra Ham MD    0.9 % sodium chloride infusion, , IntraVENous, PRN, Gilberto White MD    nalbuphine (NUBAIN) injection 5 mg, 5 mg, IntraMUSCular, Once PRN **AND** nalbuphine (NUBAIN) injection 5 mg, 5 mg, IntraVENous, Once PRN, Gilberto White MD    ondansetron (ZOFRAN) injection 4 mg, 4 mg, IntraVENous, Q6H PRN, Gilberto White MD    diphenhydrAMINE (BENADRYL) tablet 25 mg, 25 mg, Oral, Q4H PRN, Gilberto White MD    methylergonovine (METHERGINE) injection 200 mcg, 200 mcg, IntraMUSCular, PRN, Gilberto White MD    carboprost (HEMABATE) injection 250 mcg, 250 mcg, IntraMUSCular, PRN, Gilberto White MD    miSOPROStol (CYTOTEC) tablet 400 mcg, 400 mcg, Buccal, PRN, Gilberto White MD    tranexamic acid (CYKLOKAPRON) 1,000 mg in sodium chloride 0.9 % 110 mL IVPB (mini-bag), 1,000 mg, IntraVENous, Once PRN, Aaron Higgins MD    acetaminophen (TYLENOL) tablet 650 mg, 650 mg, Oral, Q4H PRN, Aaron Higgins MD    benzocaine-menthol (DERMOPLAST) 20-0.5 % spray, , Topical, PRN, Aaron Higgins MD    docusate sodium (COLACE) capsule 100 mg, 100 mg, Oral, BID PRN, Aaron Higgins MD    naloxone 0.4 mg in 10 mL sodium chloride syringe, , IntraVENous, PRN, Aaron Higgins MD    fentaNYL 125 mcg, ropivacaine 0.2% in sodium chloride 0.9% 127.5ml (OB) epidural, 12 mL/hr, Epidural, Continuous, Aaron Higgins MD, Last Rate: 11 mL/hr at 23 0659, 11 mL/hr at 23 0659    OB History:     Gyn History: Denies h/o abnormal pap smear, h/o STDs. Past Medical History:   Past Medical History:   Diagnosis Date    Anemia 2 months ago    Anxiety        Past Surgical History:   Past Surgical History:   Procedure Laterality Date    EAR SURGERY Right     TONSILLECTOMY      UPPER GASTROINTESTINAL ENDOSCOPY N/A 10/20/2022    EGD WITH BIOPSY performed by Zoie Barton MD at [address] [place] [address] History:   Social History     Tobacco Use   Smoking Status Former    Types: E-Cigarettes   Smokeless Tobacco Never        Family History: Noncontributory; Denies h/o cancer. ROS:  Negative except as stated in HPI, denies nausea, vomiting, fever, chills, headache or dysuria.      PE:  Vitals:    23 0749   BP:    Pulse:    Resp:    Temp:    SpO2: 99%       General: well nourished, well developed, in no acute distress  CV: Normal heart sounds  Resp: breathing unlabored  Abdomen: Nontender, no rebound, no guarding  FH: 38  Cx: 4/80/-1    NST:  for labor with term pregnancy   FHR 140s, moderate variability, +accels, -decels  Ctx: irregular  Cat 1, reactive  Time for NST: approx 20-40min, please see NST strip for additional information    Labs:   Blood Type/Rh: B POS    Group B Strep:  negative  Amniosure positive    Assessment:   33 y.o.  42w0d female with term pregnancy with

## 2023-08-01 NOTE — FLOWSHEET NOTE
Called Dr. Carlin Major to notify of pt arrival. Updated on SVE, Ruptured, and EFM. New orders received.

## 2023-08-01 NOTE — ANESTHESIA POSTPROCEDURE EVALUATION
Department of Anesthesiology  Postprocedure Note    Patient: Gracia Pringle  MRN: [id number]  YOB: 1995  Date of evaluation: 2023      Procedure Summary     Date: 23 Room / Location: [facility]    Anesthesia Start: 4548 Anesthesia Stop:     Procedures:        SECTION      Labor Analgesia Diagnosis: (Failure to Progress)    Surgeons: Danny Rainey MD Responsible Provider: INDERJIT Nicolas CRNA    Anesthesia Type: Spinal ASA Status: 2          Anesthesia Type: Spinal    Shobha Phase I:      Shobha Phase II:        Anesthesia Post Evaluation    Patient location during evaluation: bedside (323)  Patient participation: complete - patient participated  Level of consciousness: awake  Pain score: 0  Airway patency: patent  Nausea & Vomiting: no nausea and no vomiting  Complications: no  Cardiovascular status: hemodynamically stable  Respiratory status: spontaneous ventilation, acceptable, nonlabored ventilation and nasal cannula  Hydration status: stable  Pain management: adequate

## 2023-08-01 NOTE — OP NOTE
OPERATIVE NOTE:  DELIVERY     33 y.o.  at 38w0d presented to [facility] triage for ROM and CTX. Pt with arrest of dilation at 7cm for >4hrs and pt with inadequate pain relief. Pt requesting primary CD. Risks, benefits and alternative therapies for  delivery were reviewed with the patient and the patient agrees to proceed with primary  delivery. PreOp Dx: Arrest of dilation  PostOp Dx: same   Procedure: Low Transverse  section  Surgeon: Debbie Torres  Assistant: KEN  Anesthesia: Spinal   EBL: 800cc - please see nursing chart/notes for additional EBL  Findings: viable female infant at 15:27, Apgars 8, 9 wt 2858g      Pt was taken to the OR where she was prepped and draped in the normal sterile fashion in a supine position with a leftward tilt. Time out was then performed to confirm proper patient, placement and procedure. After ensuring adequate anesthesia, a Pfannenstiel incision was made, carried to the the fascia which was then incised in the midline and extended laterally with cautery. The rectus muscle was then dissected off the fascia superiorly and the peritoneum was entered bluntly. An Pavan retractor was then placed without difficulty. The hysterotomy was then made and the uterine cavity was entered bluntly. Clear amniotic fluid with a foul odor was noted. The infant's head, in the direct OP presentation, was then grasped and delivered atraumatically followed quickly by the rest of the body. The infant was then laid on the surgical bed and the cord remained intact for 30 seconds for delayed cord clamping. The cord was then clamped and cut and the infant was handed to the awaiting nurse. The cord blood was then drawn for labs and the placenta delivered spontaneously. The uterus was then cleared of all clots and debris. The hysterotomy was then closed in a running fashion. A second layer of imbricating suture was placed for a 2-layer closure.  The hysterotomy was again inspected and

## 2023-08-01 NOTE — FLOWSHEET NOTE
Call to Dr. Osvaldo Drummond. Update given on pts status and EFM strip. Requested bedside to speak with pt.     6848 Dr. Osvaldo Drummond bedside. EFM strip reviewed. SVE performed. Speaking to pt regarding plan of care. Plan to sit for another epidural at this time.

## 2023-08-01 NOTE — FLOWSHEET NOTE
CRNA notified of pt reporting possible epidural displaced and increase in pain. Stated she will be bedside shortly. [address] bedside. Epidural site assess. Pt sitting. Tape removed and replaced. Plan to give epidural bolus.

## 2023-08-01 NOTE — FLOWSHEET NOTE
Dr. Evelio Clarke bedside. Update given on pts status. Informed of turning pitocin off due to pts pain. EFM strip reviewed. Plan to restart pitocin.

## 2023-08-01 NOTE — ANESTHESIA PROCEDURE NOTES
Epidural Block    Patient location during procedure: OB  Start time: 8/1/2023 6:49 AM  End time: 8/1/2023 6:57 AM  Reason for block: labor epidural  Staffing  Resident/CRNA: INDERJIT Martinez - CRNA  Epidural  Patient position: sitting  Prep: Betadine and site prepped and draped  Patient monitoring: continuous pulse ox and frequent blood pressure checks  Approach: midline  Location: L4-5  Injection technique: DARA saline  Provider prep: mask and sterile gloves  Needle  Needle type: Tuohy   Needle gauge: 17 G  Needle length: 3.5 in  Needle insertion depth: 7 cm  Catheter type: side hole  Catheter size: 20 G  Test dose: negative  Kit: perifix  Lot number: [id number]  Expiration date: 11/30/2024Catheter Secured: tegaderm and tape  Assessment  Sensory level: T8  Hemodynamics: stable  Attempts: 1  Outcomes: uncomplicated and patient tolerated procedure well  Additional Notes  Sterility maintained

## 2023-08-01 NOTE — ANESTHESIA PROCEDURE NOTES
Spinal Block    Patient location during procedure: OB  End time: 8/1/2023 3:19 PM  Reason for block: primary anesthetic  Staffing  Performed: resident/CRNA   Resident/CRNA: INDERJIT Paige CRNA  Spinal Block  Patient position: sitting  Prep: Betadine and site prepped and draped  Patient monitoring: cardiac monitor, continuous pulse ox and frequent blood pressure checks  Approach: midline  Location: L4/L5  Provider prep: mask and sterile gloves  Local infiltration: lidocaine  Needle  Needle type: Pencan   Needle gauge: 24 G  Needle length: 4 in  Kit: pencanc spinal tray  Expiration date: 1/31/2024  Assessment  Sensory level: T8  Swirl obtained: Yes  CSF: clear  Attempts: 1  Hemodynamics: stable  Additional Notes  FHR stable, VSS, Pt has adequate block  Preanesthetic Checklist  Completed: patient identified, IV checked, site marked, risks and benefits discussed, surgical/procedural consents, equipment checked, pre-op evaluation, timeout performed, anesthesia consent given, oxygen available, monitors applied/VS acknowledged, fire risk safety assessment completed and verbalized and blood product R/B/A discussed and consented

## 2023-08-02 LAB
BASOPHILS # BLD: 0 K/UL (ref 0–0.2)
BASOPHILS NFR BLD: 0 %
EOSINOPHIL # BLD: 0 K/UL (ref 0–0.7)
EOSINOPHIL NFR BLD: 0 %
ERYTHROCYTE [DISTWIDTH] IN BLOOD BY AUTOMATED COUNT: 13.8 % (ref 11.5–14.5)
HCT VFR BLD AUTO: 25.5 % (ref 37–47)
HGB BLD-MCNC: 8.7 G/DL (ref 12–16)
LYMPHOCYTES # BLD: 1.1 K/UL (ref 1–4.8)
LYMPHOCYTES NFR BLD: 4 %
MCH RBC QN AUTO: 29.1 PG (ref 27–31.3)
MCHC RBC AUTO-ENTMCNC: 34 % (ref 33–37)
MCV RBC AUTO: 85.7 FL (ref 79.4–94.8)
MONOCYTES # BLD: 1.1 K/UL (ref 0.2–0.8)
MONOCYTES NFR BLD: 4 %
NEUTROPHILS # BLD: 26.1 K/UL (ref 1.4–6.5)
NEUTS BAND NFR BLD MANUAL: 7 % (ref 5–11)
NEUTS SEG NFR BLD: 85 %
PLATELET # BLD AUTO: 268 K/UL (ref 130–400)
PLATELET BLD QL SMEAR: ABNORMAL
RBC # BLD AUTO: 2.98 M/UL (ref 4.2–5.4)
SLIDE REVIEW: ABNORMAL
TOXIC GRANULATION: ABNORMAL
WBC # BLD AUTO: 28.4 K/UL (ref 4.8–10.8)

## 2023-08-02 PROCEDURE — 1220000000 HC SEMI PRIVATE OB R&B

## 2023-08-02 PROCEDURE — 85025 COMPLETE CBC W/AUTO DIFF WBC: CPT

## 2023-08-02 PROCEDURE — 6370000000 HC RX 637 (ALT 250 FOR IP): Performed by: OBSTETRICS & GYNECOLOGY

## 2023-08-02 PROCEDURE — 6360000002 HC RX W HCPCS: Performed by: OBSTETRICS & GYNECOLOGY

## 2023-08-02 PROCEDURE — 2580000003 HC RX 258: Performed by: OBSTETRICS & GYNECOLOGY

## 2023-08-02 RX ADMIN — SODIUM CHLORIDE, PRESERVATIVE FREE 10 ML: 5 INJECTION INTRAVENOUS at 11:24

## 2023-08-02 RX ADMIN — DOCUSATE SODIUM 100 MG: 100 CAPSULE, LIQUID FILLED ORAL at 09:21

## 2023-08-02 RX ADMIN — KETOROLAC TROMETHAMINE 30 MG: 30 INJECTION, SOLUTION INTRAMUSCULAR; INTRAVENOUS at 04:00

## 2023-08-02 RX ADMIN — IBUPROFEN 600 MG: 600 TABLET, FILM COATED ORAL at 23:08

## 2023-08-02 RX ADMIN — PRENATAL VIT W/ FE FUMARATE-FA TAB 27-0.8 MG 1 TABLET: 27-0.8 TAB at 09:21

## 2023-08-02 RX ADMIN — IBUPROFEN 600 MG: 600 TABLET, FILM COATED ORAL at 16:36

## 2023-08-02 RX ADMIN — KETOROLAC TROMETHAMINE 30 MG: 30 INJECTION, SOLUTION INTRAMUSCULAR; INTRAVENOUS at 11:24

## 2023-08-02 ASSESSMENT — PAIN DESCRIPTION - LOCATION
LOCATION: INCISION;ABDOMEN
LOCATION: INCISION

## 2023-08-02 ASSESSMENT — PAIN SCALES - GENERAL
PAINLEVEL_OUTOF10: 6
PAINLEVEL_OUTOF10: 4
PAINLEVEL_OUTOF10: 10
PAINLEVEL_OUTOF10: 4

## 2023-08-02 ASSESSMENT — PAIN DESCRIPTION - DESCRIPTORS
DESCRIPTORS: SORE;CRAMPING
DESCRIPTORS: SORE;DISCOMFORT;ACHING

## 2023-08-02 NOTE — FLOWSHEET NOTE
Dr. Carlin Major bedside. Aware of pts elevated blood pressures during epidural. Informed of pts pain and anxiety during procedure attempt. No new orders received.

## 2023-08-02 NOTE — LACTATION NOTE
This note was copied from a baby's chart. Mother states she tried to breastfeed yesterday. States infant was having trouble opening mouth and she cried and cried so she gave her a bottle because she did not want her to be hungry. States she bottle fed her through the night. Is willing to try to breastfeed today. Infant does not open mouth wide when crying, holds lips under. Oral assessment done and MFR explained to mother. Mother states infant was head down almost entire pregnancy. Vomer shallow. Vomer release done. Infant sucks well on gloved finger despite some clenching. Pterygoid restriction bilaterally. Pterygoid release done. Infant placed to breast in football hold. Infant fussy, rooting. Mother shown asymmetric latch. Infant latches after several attempts then sleeps at the breast. Placed skin to skin. Mother encouraged to call this [facility]] for assistance when infant shows hunger cues.

## 2023-08-02 NOTE — PLAN OF CARE
Problem: Pain  Goal: Verbalizes/displays adequate comfort level or baseline comfort level  2023 by Neli Win RN  Outcome: Progressing  2023 1206 by Lenny Moore RN  Outcome: Progressing     Problem: Infection - Adult  Goal: Absence of infection at discharge  2023 by Neli Win RN  Outcome: Progressing  2023 1206 by Lenny Moore RN  Outcome: Progressing  Goal: Absence of infection during hospitalization  2023 by Neli Win RN  Outcome: Progressing  2023 1206 by Lenny Moore RN  Outcome: Progressing  Goal: Absence of fever/infection during anticipated neutropenic period  2023 by Neli Win RN  Outcome: Progressing  2023 by Lenny Moore RN  Outcome: Progressing     Problem: Safety - Adult  Goal: Free from fall injury  2023 by Neli Win RN  Outcome: Progressing  2023 by Lenny Moore RN  Outcome: Progressing     Problem: Postpartum  Goal: Experiences normal postpartum course  Description:  Postpartum OB-Pregnancy care plan goal which identifies if the mother is experiencing a normal postpartum course  2023 by Neli Win RN  Outcome: Progressing  2023 by Lenny Moore RN  Outcome: Progressing  Goal: Appropriate maternal -  bonding  Description:  Postpartum OB-Pregnancy care plan goal which identifies if the mother and  are bonding appropriately  2023 by Neli Win RN  Outcome: Progressing  2023 by Lenny Moore RN  Outcome: Progressing  Goal: Establishment of infant feeding pattern  Description:  Postpartum OB-Pregnancy care plan goal which identifies if the mother is establishing a feeding pattern with their   2023 by Neli Win RN  Outcome: Progressing  2023 by Lenny Moore RN  Outcome: Progressing  Goal: Incisions, wounds, or drain sites healing without S/S of infection  2023 by Neli Win RN  Outcome: Progressing  8/2/2023 1206 by Lalito Shen RN  Outcome: Progressing     Problem: Discharge Planning  Goal: Discharge to home or other facility with appropriate resources  8/2/2023 1942 by April Crawford RN  Outcome: Progressing  8/2/2023 1206 by Lalito Shen RN  Outcome: Progressing

## 2023-08-02 NOTE — PLAN OF CARE
Problem: Pain  Goal: Verbalizes/displays adequate comfort level or baseline comfort level  2023 by Neli Win RN  Outcome: Progressing  2023 1110 by Dillon Martinez RN  Outcome: Progressing     Problem: Vaginal Birth or  Section  Goal: Fetal and maternal status remain reassuring during the birth process  Description:  Birth OB-Pregnancy care plan goal which identifies if the fetal and maternal status remain reassuring during the birth process  2023 by Neli Win RN  Outcome: Completed  2023 1110 by Dillon Martinez RN  Outcome: Progressing     Problem: Infection - Adult  Goal: Absence of infection at discharge  2023 by Neli Win RN  Outcome: Progressing  2023 111 by Dillon Martinez RN  Outcome: Progressing  Goal: Absence of infection during hospitalization  2023 by Neli Win RN  Outcome: Progressing  2023 1110 by Dillon Martinez RN  Outcome: Progressing  Goal: Absence of fever/infection during anticipated neutropenic period  2023 by Neli Win RN  Outcome: Progressing  2023 1110 by Dillon Martinez RN  Outcome: Progressing     Problem: Safety - Adult  Goal: Free from fall injury  2023 by Neli Win RN  Outcome: Progressing  2023 by Dillon Martinez RN  Outcome: Progressing

## 2023-08-02 NOTE — PLAN OF CARE
Problem: Pain  Goal: Verbalizes/displays adequate comfort level or baseline comfort level  2023 1206 by Aayush Armendariz RN  Outcome: Progressing  2023 2359 by Gen Nielsen RN  Outcome: Progressing     Problem: Infection - Adult  Goal: Absence of infection at discharge  2023 1206 by Aayush Armendariz RN  Outcome: Progressing  2023 2359 by Gen Nielsen RN  Outcome: Progressing  Goal: Absence of infection during hospitalization  2023 1206 by Aayush Armendariz RN  Outcome: Progressing  2023 2359 by Gen Nielsen RN  Outcome: Progressing  Goal: Absence of fever/infection during anticipated neutropenic period  2023 1206 by Aayush Armendariz RN  Outcome: Progressing  2023 2359 by Gen Nielsen RN  Outcome: Progressing     Problem: Safety - Adult  Goal: Free from fall injury  2023 1206 by Aayush Armendariz RN  Outcome: Progressing  2023 2359 by Gen Nielsen RN  Outcome: Progressing     Problem: Postpartum  Goal: Experiences normal postpartum course  Description:  Postpartum OB-Pregnancy care plan goal which identifies if the mother is experiencing a normal postpartum course  2023 1206 by Aayush Armendariz RN  Outcome: Progressing  2023 0000 by Gen Nielsen RN  Outcome: Progressing  Goal: Appropriate maternal -  bonding  Description:  Postpartum OB-Pregnancy care plan goal which identifies if the mother and  are bonding appropriately  2023 1206 by Aayush Armendariz RN  Outcome: Progressing  2023 0000 by Gen Nielsen RN  Outcome: Progressing  Goal: Establishment of infant feeding pattern  Description:  Postpartum OB-Pregnancy care plan goal which identifies if the mother is establishing a feeding pattern with their   2023 1206 by Aayush Armendariz RN  Outcome: Progressing  2023 0000 by Gen Nielsen RN  Outcome: Progressing  Goal: Incisions, wounds, or drain sites healing without S/S of infection  2023 1206 by Aayush Armendariz

## 2023-08-02 NOTE — PLAN OF CARE
Problem: Postpartum  Goal: Experiences normal postpartum course  Description:  Postpartum OB-Pregnancy care plan goal which identifies if the mother is experiencing a normal postpartum course  Outcome: Progressing  Goal: Appropriate maternal -  bonding  Description:  Postpartum OB-Pregnancy care plan goal which identifies if the mother and  are bonding appropriately  Outcome: Progressing  Goal: Establishment of infant feeding pattern  Description:  Postpartum OB-Pregnancy care plan goal which identifies if the mother is establishing a feeding pattern with their   Outcome: Progressing  Goal: Incisions, wounds, or drain sites healing without S/S of infection  Outcome: Progressing     Problem: Discharge Planning  Goal: Discharge to home or other facility with appropriate resources  Outcome: Progressing

## 2023-08-02 NOTE — LACTATION NOTE
This note was copied from a baby's chart. Mother states she attempted to latch but infant did not latch. Assisted now, infant latched after several attempts. Infant gets fussy as soon as she gets to breast. Mom shown comforting techniques to calm baby. 1100- assistance with latch. Infant latched after several attempts, roots and fussy until latched. Mother denies discomfort with latch.

## 2023-08-02 NOTE — FLOWSHEET NOTE
Patient up to bathroom with RN at bedside. Minimal assist. Tolerated well. New pads, underwear, and gown applied. Patient moved to room 333.

## 2023-08-03 PROCEDURE — 6370000000 HC RX 637 (ALT 250 FOR IP): Performed by: OBSTETRICS & GYNECOLOGY

## 2023-08-03 PROCEDURE — 1220000000 HC SEMI PRIVATE OB R&B

## 2023-08-03 RX ADMIN — IBUPROFEN 600 MG: 600 TABLET, FILM COATED ORAL at 05:07

## 2023-08-03 RX ADMIN — PRENATAL VIT W/ FE FUMARATE-FA TAB 27-0.8 MG 1 TABLET: 27-0.8 TAB at 08:59

## 2023-08-03 RX ADMIN — IBUPROFEN 600 MG: 600 TABLET, FILM COATED ORAL at 13:23

## 2023-08-03 RX ADMIN — IBUPROFEN 600 MG: 600 TABLET, FILM COATED ORAL at 21:31

## 2023-08-03 RX ADMIN — DOCUSATE SODIUM 100 MG: 100 CAPSULE, LIQUID FILLED ORAL at 08:59

## 2023-08-03 ASSESSMENT — PAIN SCALES - GENERAL
PAINLEVEL_OUTOF10: 4
PAINLEVEL_OUTOF10: 3
PAINLEVEL_OUTOF10: 8
PAINLEVEL_OUTOF10: 6
PAINLEVEL_OUTOF10: 3

## 2023-08-03 ASSESSMENT — PAIN DESCRIPTION - LOCATION
LOCATION: ABDOMEN;INCISION
LOCATION: INCISION

## 2023-08-03 ASSESSMENT — ENCOUNTER SYMPTOMS
NAUSEA: 0
SHORTNESS OF BREATH: 0
SORE THROAT: 0
COUGH: 0
ABDOMINAL PAIN: 0

## 2023-08-03 ASSESSMENT — PAIN DESCRIPTION - DESCRIPTORS: DESCRIPTORS: SORE;DISCOMFORT;CRAMPING

## 2023-08-03 NOTE — PLAN OF CARE
Problem: Pain  Goal: Verbalizes/displays adequate comfort level or baseline comfort level  8/3/2023 0756 by Aydin Guajardo RN  Outcome: Progressing  2023 by Shira Kurtz RN  Outcome: Progressing     Problem: Infection - Adult  Goal: Absence of infection at discharge  8/3/2023 0756 by Aydin Guajardo RN  Outcome: Progressing  2023 by Shira Kurtz RN  Outcome: Progressing  Goal: Absence of infection during hospitalization  8/3/2023 0756 by Aydin Guajardo RN  Outcome: Progressing  2023 by Shira Kurtz RN  Outcome: Progressing  Goal: Absence of fever/infection during anticipated neutropenic period  8/3/2023 0756 by Aydin Guajardo RN  Outcome: Progressing  2023 by Shira Kurtz RN  Outcome: Progressing     Problem: Safety - Adult  Goal: Free from fall injury  8/3/2023 0756 by Aydin Guajardo RN  Outcome: Progressing  2023 by Shira Kurtz RN  Outcome: Progressing     Problem: Postpartum  Goal: Experiences normal postpartum course  Description:  Postpartum OB-Pregnancy care plan goal which identifies if the mother is experiencing a normal postpartum course  8/3/2023 0756 by Aydin Guajardo RN  Outcome: Progressing  2023 by Shira Kurtz RN  Outcome: Progressing  Goal: Appropriate maternal -  bonding  Description:  Postpartum OB-Pregnancy care plan goal which identifies if the mother and  are bonding appropriately  8/3/2023 0756 by Aydin Guajardo RN  Outcome: Progressing  2023 by Shira Kurtz RN  Outcome: Progressing  Goal: Establishment of infant feeding pattern  Description:  Postpartum OB-Pregnancy care plan goal which identifies if the mother is establishing a feeding pattern with their   8/3/2023 0756 by Aydin Guajardo RN  Outcome: Progressing  2023 by Shira Kurtz RN  Outcome: Progressing  Goal: Incisions, wounds, or drain sites healing without S/S of infection  8/3/2023 0756 by Aydin Guajardo RN  Outcome: Progressing  2023 1942 by Neli Win, RN  Outcome: Progressing     Problem: Discharge Planning  Goal: Discharge to home or other facility with appropriate resources  8/3/2023 0756 by Serena Jorge RN  Outcome: Progressing  8/2/2023 1942 by Neli Win RN  Outcome: Progressing

## 2023-08-04 VITALS
RESPIRATION RATE: 18 BRPM | DIASTOLIC BLOOD PRESSURE: 77 MMHG | OXYGEN SATURATION: 98 % | TEMPERATURE: 99.1 F | SYSTOLIC BLOOD PRESSURE: 126 MMHG | HEART RATE: 74 BPM

## 2023-08-04 PROCEDURE — 99024 POSTOP FOLLOW-UP VISIT: CPT | Performed by: OBSTETRICS & GYNECOLOGY

## 2023-08-04 PROCEDURE — 6370000000 HC RX 637 (ALT 250 FOR IP): Performed by: OBSTETRICS & GYNECOLOGY

## 2023-08-04 RX ORDER — PSEUDOEPHEDRINE HCL 30 MG
100 TABLET ORAL DAILY
Qty: 30 CAPSULE | Refills: 1 | Status: SHIPPED | OUTPATIENT
Start: 2023-08-05 | End: 2023-09-04

## 2023-08-04 RX ADMIN — PRENATAL VIT W/ FE FUMARATE-FA TAB 27-0.8 MG 1 TABLET: 27-0.8 TAB at 08:28

## 2023-08-04 RX ADMIN — DOCUSATE SODIUM 100 MG: 100 CAPSULE, LIQUID FILLED ORAL at 08:28

## 2023-08-04 RX ADMIN — ACETAMINOPHEN 1000 MG: 500 TABLET ORAL at 08:28

## 2023-08-04 RX ADMIN — IBUPROFEN 600 MG: 600 TABLET, FILM COATED ORAL at 05:31

## 2023-08-04 ASSESSMENT — PAIN SCALES - GENERAL
PAINLEVEL_OUTOF10: 7
PAINLEVEL_OUTOF10: 4
PAINLEVEL_OUTOF10: 2
PAINLEVEL_OUTOF10: 5

## 2023-08-04 ASSESSMENT — PAIN DESCRIPTION - LOCATION: LOCATION: BACK;ABDOMEN

## 2023-08-04 NOTE — LACTATION NOTE
This note was copied from a baby's chart. In to see mom and baby about feeding. Mom states that she is planning on breastfeeding when she gets home. Explained to mom how breast milk is made by supply and demand. Encouraged mom to pump her breasts if baby is not latching to encourage milk production. Mom states she does not have a pump. Pump form and hand pump provided with instruction. Encouraged mom to call [facility] for assistance is she wants to attempt to breastfeed today. Mom states that she is planning on going home early. Reviewed outpatient lactation services available and encouraged her to attend support group on Thursday.

## 2023-08-04 NOTE — PLAN OF CARE
Problem: Pain  Goal: Verbalizes/displays adequate comfort level or baseline comfort level  2023 by Steven Wallace RN  Outcome: Adequate for Discharge  8/3/2023 2247 by Nathaly Gardner RN  Outcome: Progressing     Problem: Infection - Adult  Goal: Absence of infection at discharge  2023 by Steven Wallace RN  Outcome: Adequate for Discharge  8/3/2023 2247 by Nathaly Gardner RN  Outcome: Progressing  Goal: Absence of infection during hospitalization  2023 by Steven Wallace RN  Outcome: Adequate for Discharge  8/3/2023 2247 by Nathaly Gardner RN  Outcome: Progressing  Goal: Absence of fever/infection during anticipated neutropenic period  2023 by Steven Wallace RN  Outcome: Adequate for Discharge  8/3/2023 2247 by Nathaly Gardner RN  Outcome: Progressing     Problem: Safety - Adult  Goal: Free from fall injury  2023 by Steven Wallace RN  Outcome: Adequate for Discharge  8/3/2023 2247 by Nathaly Gardner RN  Outcome: Progressing     Problem: Postpartum  Goal: Experiences normal postpartum course  Description:  Postpartum OB-Pregnancy care plan goal which identifies if the mother is experiencing a normal postpartum course  2023 by Steven Wallace RN  Outcome: Adequate for Discharge  8/3/2023 2247 by Nathaly Gardner RN  Outcome: Progressing  Goal: Appropriate maternal -  bonding  Description:  Postpartum OB-Pregnancy care plan goal which identifies if the mother and  are bonding appropriately  2023 by Steven Wallace RN  Outcome: Adequate for Discharge  8/3/2023 2247 by Nathaly Gardner RN  Outcome: Progressing  Goal: Establishment of infant feeding pattern  Description:  Postpartum OB-Pregnancy care plan goal which identifies if the mother is establishing a feeding pattern with their   2023 7219 by Steven Wallace RN  Outcome: Adequate for Discharge  8/3/2023 2247 by Nathaly Gardner RN  Outcome: Progressing  Goal:

## 2023-08-04 NOTE — DISCHARGE SUMMARY
Discharge Summary    Carlie Sims  :  1995  MRN:  [id number]    ADMIT DATE:  2023  DISCHARGE DATE:  2023    PRIMARY CARE PHYSICIAN:  Nneka Garcia MD    VISIT STATUS: Admission    CODE STATUS:  Prior    DISCHARGE DIAGNOSES:  Principal Problem:    Admitted to labor and delivery  Active Problems:    Normal labor and delivery     delivery delivered  Resolved Problems:    * No resolved hospital problems. *      HOSPITAL COURSE:  33 y.o.  at 38w0d presented to [facility] triage for ROM and CTX. Pt with arrest of dilation at 7cm for >4hrs and pt with inadequate pain relief. Pt requesting primary CD. Risks, benefits and alternative therapies for  delivery were reviewed with the patient and the patient agrees to proceed with primary  delivery. Postpartum and post op course was uncomplicated and she achieved postpartum goals. On POD#3 she was discharge with instructions, medications and follow up with Dr. Judah Cervantes in 1 week. CONSULTANTS:  Lactation  RECOMMENDED NEXT STEPS:   Discharge Home     DISCHARGE MEDICATIONS:         Medication List        START taking these medications      ibuprofen 600 MG tablet  Commonly known as: ADVIL;MOTRIN  Take 1 tablet by mouth every 6 hours as needed for Pain     oxyCODONE-acetaminophen 5-325 MG per tablet  Commonly known as: Percocet  Take 1 tablet by mouth every 6 hours as needed for Pain for up to 5 days.  . Take lowest dose possible to manage pain Max Daily Amount: 4 tablets            ASK your doctor about these medications      PRENATAL 1+1 PO               Where to Get Your Medications        These medications were sent to [address] [phone] [facility] [phone]  [address] [place], [place] [address]danielle Cisneros [phone]      Hours: 24-hours Phone: [phone]   ibuprofen 600 MG tablet       You can get these medications from any pharmacy    Bring a paper prescription for each of these medications  oxyCODONE-acetaminophen 5-325 MG per tablet         DIET: ADULT DIET; Regular    ACTIVITY: Pelvic rest for 6 weeks  ______________________________________________________________________  COMPLEXITY OF FOLLOW UP:   [] Moderate Complexity: follow up within 7-14 calendar days (46298)   [x] Severe Complexity: follow up within 7 calendar days (06732)    FOLLOW UP TESTING, PENDING RESULTS OR REFERRALS AT TRANSITIONAL CARE VISIT:   []  Yes    [x]  No        DISPOSITION: Home      Follow up with Dr. Annabelle Johnson in 1 week    INSTRUCTIONS TO MA/SW: Please call patient on day after discharge (must document patient  contacted within 2 business days of discharge). FOLLOW UP QUESTIONS FOR MA/SW:  1. Did you get medications filled and taking them as instructed from discharge? 2. Are you following your discharge instructions from your hospital stay? 3. Please confirm patient is scheduled for a follow up appointment within the above time frame.     DISCHARGE TIME: > 30 minutes    SIGNED:  Juwan Thayer MD   8/4/2023, 8:44 AM

## 2023-08-07 ENCOUNTER — OFFICE VISIT (OUTPATIENT)
Dept: OBGYN CLINIC | Age: 28
End: 2023-08-07

## 2023-08-07 VITALS
DIASTOLIC BLOOD PRESSURE: 72 MMHG | WEIGHT: 177 LBS | SYSTOLIC BLOOD PRESSURE: 122 MMHG | BODY MASS INDEX: 33.42 KG/M2 | HEIGHT: 61 IN

## 2023-08-07 DIAGNOSIS — Z3A.38 38 WEEKS GESTATION OF PREGNANCY: ICD-10-CM

## 2023-08-07 DIAGNOSIS — Z34.93 PRENATAL CARE IN THIRD TRIMESTER: Primary | ICD-10-CM

## 2023-08-07 PROCEDURE — 99024 POSTOP FOLLOW-UP VISIT: CPT | Performed by: OBSTETRICS & GYNECOLOGY

## 2023-08-07 NOTE — PROGRESS NOTES
SUBJECTIVE:   33 y.o. Lanie Dubsoe here for post operative exam. Pt underwent primary LTCD post op 2wks. Pt breast feeding without difficulty. Pt with irregular VB since delivery. Review of Systems:  General ROS: negative  Psychological ROS: negative  ENT ROS: negative  Endocrine ROS: negative  Respiratory ROS: no cough, shortness of breath, or wheezing  Cardiovascular ROS: no chest pain or dyspnea on exertion  Gastrointestinal ROS: no abdominal pain, change in bowel habits, or black or bloody stools  Genito-Urinary ROS: no dysuria, trouble voiding, or hematuria  Musculoskeletal ROS: negative  Neurological ROS: no TIA or stroke symptoms  Dermatological ROS: negative    OBJECTIVE:   Physical Exam:  GEN: She appears well, afebrile. HEENT: normal cephalic, atraumatic  CVS: regular rate and rhythm  ABDOMEN: benign, soft, nontender, no masses. Pfannenstiel incision, no erythema or induration  MUSCULOSKELETAL: normal gait, no masses  SKIN: normal texture and tone, no lesions  NEURO: normal tone, no hyperreflexia, 1+DTRs throughout    ASSESSMENT:   Post operative wound check - LTCS    PLAN:   Post operative precautions reviewed. Pt to continue pelvic rest and lifting precautions. Follow up for postpartum exam in 4wks.

## 2023-09-07 ENCOUNTER — OFFICE VISIT (OUTPATIENT)
Dept: OBGYN CLINIC | Age: 28
End: 2023-09-07

## 2023-09-07 VITALS
SYSTOLIC BLOOD PRESSURE: 112 MMHG | WEIGHT: 165 LBS | BODY MASS INDEX: 31.15 KG/M2 | HEIGHT: 61 IN | DIASTOLIC BLOOD PRESSURE: 86 MMHG

## 2023-09-07 DIAGNOSIS — Z30.09 BIRTH CONTROL COUNSELING: ICD-10-CM

## 2023-09-07 RX ORDER — MISOPROSTOL 200 UG/1
TABLET ORAL
Qty: 2 TABLET | Refills: 0 | Status: SHIPPED | OUTPATIENT
Start: 2023-09-07

## 2023-09-27 ENCOUNTER — TELEPHONE (OUTPATIENT)
Dept: OBGYN CLINIC | Age: 28
End: 2023-09-27

## 2023-10-07 NOTE — TELEPHONE ENCOUNTER
Pt. Is not covered through [facility] specialty pharmacy and needs to have a buy and bill mirena. We have one in office. Please schedule for placement.
Pt. Is schedulled for 10/9/2023.
No

## 2023-10-14 PROBLEM — H90.A11 CONDUCTIVE HEARING LOSS OF RIGHT EAR WITH RESTRICTED HEARING OF LEFT EAR: Status: ACTIVE | Noted: 2023-10-14

## 2023-10-14 PROBLEM — H71.91 CHOLESTEATOMA OF RIGHT EAR: Status: ACTIVE | Noted: 2023-10-14

## 2023-10-14 RX ORDER — PANTOPRAZOLE SODIUM 40 MG/1
40 TABLET, DELAYED RELEASE ORAL
COMMUNITY
End: 2023-10-17 | Stop reason: ALTCHOICE

## 2023-10-14 RX ORDER — ONDANSETRON 4 MG/1
8 TABLET, FILM COATED ORAL
COMMUNITY
End: 2023-10-17 | Stop reason: ALTCHOICE

## 2023-10-14 RX ORDER — TRAMADOL HYDROCHLORIDE 50 MG/1
1 TABLET ORAL EVERY 6 HOURS
COMMUNITY
Start: 2021-06-14 | End: 2023-10-17 | Stop reason: ALTCHOICE

## 2023-10-14 RX ORDER — CIPROFLOXACIN AND DEXAMETHASONE 3; 1 MG/ML; MG/ML
4 SUSPENSION/ DROPS AURICULAR (OTIC) 2 TIMES DAILY
COMMUNITY
Start: 2021-06-14 | End: 2023-10-17 | Stop reason: ALTCHOICE

## 2023-10-16 NOTE — PROGRESS NOTES
AUDIOLOGY ADULT AUDIOMETRIC EVALUATION      Name:  Emma Leggett  :  1995  Age:  28 y.o.  Date of Evaluation:  10/17/2023    IMPRESSIONS     Today's test results are consistent with normal hearing sensitivity in the LE and a moderate essentially conductive hearing loss from 125-500 Hz rising to mild from 5654-7612 Hz sloping to a severe conductive hearing loss. Results are consistent with most recent hearing evaluation performed on 2023. Discussed results and recommendations with patient.  Questions were addressed and the patient was encouraged to contact our department should concerns arise.    RECOMMENDATIONS     Continue medical follow up with PCP/ENT.  Return for annual hearing evaluation or sooner should concerns arise.    Time: 1591-1518    HISTORY     Reason for visit:  Emma Leggett is seen today at the request of Nneka Renteria MD for an evaluation of hearing.  Patient has a known history of right cholesteatoma and right CHL s/p R CWR tympanomastoidectomy 2020 and second look transcanal tympanoplasty with OCR (TORP 5.5mm) on 21. Today, patient complains of no significant change in hearing however recently gave birth in August of this year. She notes intermittent bilateral tinnitus and aural fullness. Symptoms are non-bothersome to her at this time. Patient denied history of dizziness or excessive noise exposure. No history of hearing aid use.    EVALUATION     See Audiogram    TEST RESULTS     Otoscopic Evaluation:  Right Ear: Clear ear canals.  Left Ear: Clear ear canals.    Tympanometry & Acoustic Reflexes:  Right Ear: Reduced eardrum mobility consistent with outer/middle ear involvement.   Left Ear: Reduced eardrum mobility consistent with outer/middle ear involvement.     Pure Tone Audiometry:    Right Ear: Moderate essentially conductive hearing loss from 125-500 Hz rising to mild from 7737-9112 Hz sloping to a severe conductive hearing loss  Left Ear: Normal hearing  sensitivity.    Speech Audiometry:   Right Ear:  Speech Reception Threshold (SRT) was obtained at 45 dBHL. Word Recognition scores were excellent in quiet when words were presented at 70 dBHL.  Left Ear:  Speech Reception Threshold (SRT) was obtained at 10 dBHL. Word Recognition scores were excellent in quiet when words were presented at 50 dBHL.    Distortion Product Otoacoustic Emissions:  DNT.    Testing and interpretation of results completed NATO Zhu, CCC-A. It was my pleasure to evaluate this patient.       NATO Zhu, CCC-A  Licensed Audiologist      Degree of Hearing Sensitivity Decibel Range   Within Normal Limits (WNL) 0-25   Mild 26-40   Moderate 41-55   Moderately-Severe 56-70   Severe 71-90   Profound 91+      Key   CNT/DNT Could Not Test/Did Not Test   TM Tympanic Membrane   WNL Within Normal Limits   HA Hearing Aid   SNHL Sensorineural Hearing Loss   CHL Conductive Hearing Loss   NIHL Noise-Induced Hearing Loss   ECV Ear Canal Volume   MLV Monitored Live Voice

## 2023-10-17 ENCOUNTER — OFFICE VISIT (OUTPATIENT)
Dept: OTOLARYNGOLOGY | Facility: CLINIC | Age: 28
End: 2023-10-17
Payer: MEDICAID

## 2023-10-17 ENCOUNTER — CLINICAL SUPPORT (OUTPATIENT)
Dept: AUDIOLOGY | Facility: CLINIC | Age: 28
End: 2023-10-17
Payer: MEDICAID

## 2023-10-17 VITALS — WEIGHT: 159.7 LBS | HEIGHT: 61 IN | BODY MASS INDEX: 30.15 KG/M2 | TEMPERATURE: 97.2 F

## 2023-10-17 DIAGNOSIS — H71.91 CHOLESTEATOMA OF RIGHT EAR: Primary | ICD-10-CM

## 2023-10-17 DIAGNOSIS — H90.A11 CONDUCTIVE HEARING LOSS OF RIGHT EAR WITH RESTRICTED HEARING OF LEFT EAR: ICD-10-CM

## 2023-10-17 DIAGNOSIS — H90.11 CONDUCTIVE HEARING LOSS OF RIGHT EAR WITH UNRESTRICTED HEARING OF LEFT EAR: Primary | ICD-10-CM

## 2023-10-17 PROCEDURE — 99213 OFFICE O/P EST LOW 20 MIN: CPT | Performed by: OTOLARYNGOLOGY

## 2023-10-17 PROCEDURE — 92557 COMPREHENSIVE HEARING TEST: CPT

## 2023-10-17 PROCEDURE — 92567 TYMPANOMETRY: CPT

## 2023-10-17 PROCEDURE — 1036F TOBACCO NON-USER: CPT | Performed by: OTOLARYNGOLOGY

## 2023-10-17 ASSESSMENT — PATIENT HEALTH QUESTIONNAIRE - PHQ9
2. FEELING DOWN, DEPRESSED OR HOPELESS: NOT AT ALL
1. LITTLE INTEREST OR PLEASURE IN DOING THINGS: NOT AT ALL
SUM OF ALL RESPONSES TO PHQ9 QUESTIONS 1 AND 2: 0

## 2023-10-17 NOTE — PROGRESS NOTES
History Of Present Illness:  Emma Leggett is a 28 y.o. female with a history of right cholesteatoma and right conductive hearing loss, she is s/p R CWR tympanomastoidectomy on 8/28/20 and second look with removal of cholesteatoma with OCR (TORP 5.5mm) on 6/14/21.    She is here for her annual Follow-up. Recently had a baby. She feels her hearing is doing well. She does not really notice a difference or deficient in her hearing. She has no drainage, no pain, no dizziness. No infections in the last year.     Recall 9/13/22:   Emma is a 28 yo female w/ a hx of right cholesteatoma and right conductive hearing loss, she is s/p R CWR tympanomastoidectomy on 8/28/20 and second look with removal of cholesteatoma with OCR (TORP 5.5mm) on 6/14/21. She tried to make an appointment to discuss hearing aids with audiology but was unable to. She denies any significant ear problems over the past year.     Surgical History:  She has a past surgical history that includes Other surgical history (05/05/2020).     Allergies:  Patient has no known allergies.    Medications:   No current outpatient medications     Review of Systems:   A comprehensive 10-point review of systems was obtained including constitutional, neurological, HEENT, pulmonary, cardiovascular, genito-urinary, and other pertinent systems and was negative except as noted in the HPI.      Physical Exam:  Constitutional   General appearance: Healthy-appearing, well-nourished, well groomed, in no acute distress.   Ability to communicate: Normal communication without aids, normal voice quality.       Head and face: Atraumatic with no masses, lesions, or scarring.   Facial strength: Normal strength and symmetry, no synkinesis or facial tic.     Ears  Otoscopic examination: left normal otoscopy of the tympanic membrane, on the right, the TM is translucent and healthy appearing, the visualized middle ear appears aerated, there are no signs of recurrent cholesteatoma or  "recurrence, no perforation      Nose: Dorsum symmetric with no visible or palpable deformities.     Oral Cavity/Mouth  Lips, teeth, and gums: Normal lips, gums, and dentition.     Oropharynx: Mucosa moist, no lesions.     Neck: Symmetrical, trachea midline. No masses visible.     Neurological/Psychiatric  Cranial Nerve Examination: II - XII grossly intact.  Orientation to person, place, and time: Normal.  Mood and affect: Normal.     Skin: Normal without rashes or lesions.     Pulmonary  Respiratory effort: Chest expands symmetrically.     Cardiovascular: Good peripheral pulses  Peripheral vascular system: No varicosities, carotid pulse normal, no edema. No jugular venous distension.     Extremities: Appearance of extremities: Normal. Gait normal.     Last Recorded Vitals:  Temperature 36.2 °C (97.2 °F), height 1.549 m (5' 1\"), weight 72.4 kg (159 lb 11.2 oz).    Relevant Results:    Audiogram from 10/17/23 was reviewed and shows bilateral type A tympanograms and 100% WRS bilaterally. Left hearing was normal. On the right, there remains a moderate conductive hearing loss and when compared to 1 year ago, there is a slight worsening of the conductive hearing loss.       Assessment/Plan   28 y.o. female with a history of right cholesteatoma and right conductive hearing loss, she is s/p R CWR tympanomastoidectomy on 8/28/20 and second look with removal of cholesteatoma with OCR (TORP 5.5mm) on 6/14/21.    She is 2 years out now from her procedure and her new audiogram shows bilateral type A tympanograms and excellent WRS b/l. On the right, she does have a slight worsening of the audiometric CHL compared to her audio 1 year post op which I believe may be a result of a potentially short TORP now that her middle ear is aerated. Clinically on exam her ear appears healthy with no concerns for cholesteatoma recurrence.     We did discuss that we are able to return to OR to reassess position of the TORP but the degree of her " hearing loss right now is not impacting her communication or daily activities and therefore we'll continue to monitor. She will be evaluated for hearing aids now that she is insured     -Follow-up in 1 year with audiogram  -hearing aid certification/clearance form completed and provided to patient.    Seen with attending dr. Dexter Blas MD  PGY-5 - Otolaryngology    I saw and evaluated the patient. I personally obtained the key and critical portions of the history and physical exam or was physically present for key and critical portions performed by the resident/fellow. I reviewed the resident/fellow's documentation and discussed the patient with the resident/fellow. I agree with the resident/fellow's medical decision making as documented in the note.    Nneka Renteria MD

## 2023-10-17 NOTE — PATIENT INSTRUCTIONS
Welcome to Dr. Renteria's clinic. We are here to assist you through your ENT care at [facility].  Dr. Renteria is an Ear surgeon. This means that she specializes in taking care of patients with complex ear problems.     Dr. Renteria's office number is [phone]. While you may see her at a satellite office, she has a team committed to help meet your healthcare needs at [facility]'s [facility]. This number is the most direct way to communicate with the office.     Kandace is Dr. Renteria's  and she answers the office phone from 8am-4pm Mon-Fri. She can help you with many general questions and information. Questions that she cannot answer will be directed to the appropriate staff. You may need to leave a message. In this case, someone from the team will call you back.     Carlos is Dr. Renteria's primary nurse and can be reached by calling the office. Carlos is in clinic with Dr. Renteria's on Mondays and Tuesdays. Non-urgent calls will be returned on non-clinic days typically Thursdays.     Sometimes, other team members will also be involved in your care. These people may include dieticians, social workers, speech therapists, audiologist, neurologist, and physical therapist. Dr. Renteria will provide these referrals as needed. Please let her know if you would like to request a specific referral.     For your convenience, Dr. Renteria sees patients at several [facility] locations including [facility] and [facility] at the main campus of [facility]. While we try to make your appointments as convenient as possible, occasionally a visit to another location may be necessary to provide the best care for you. We look forward to working with you to meet your healthcare goals.     Dr. Renteria makes every effort to run on time for your appointments. Therefore, if you are more than 30 minutes late unrelated to a scan or another appointment such therapy or audio you will have to reschedule.

## 2024-09-07 ENCOUNTER — HOSPITAL ENCOUNTER (EMERGENCY)
Age: 29
Discharge: HOME | End: 2024-09-07

## 2024-09-07 VITALS
HEART RATE: 87 BPM | TEMPERATURE: 98 F | BODY MASS INDEX: 31.02 KG/M2 | WEIGHT: 158 LBS | RESPIRATION RATE: 16 BRPM | DIASTOLIC BLOOD PRESSURE: 94 MMHG | SYSTOLIC BLOOD PRESSURE: 146 MMHG | OXYGEN SATURATION: 100 % | HEIGHT: 60 IN

## 2024-09-07 DIAGNOSIS — J02.9 VIRAL PHARYNGITIS: Primary | ICD-10-CM

## 2024-09-07 LAB
INFLUENZA A BY PCR: NEGATIVE
INFLUENZA B BY PCR: NEGATIVE
SARS-COV-2 RDRP RESP QL NAA+PROBE: NOT DETECTED
STREP GRP A PCR: NEGATIVE

## 2024-09-07 PROCEDURE — 87502 INFLUENZA DNA AMP PROBE: CPT

## 2024-09-07 PROCEDURE — 99283 EMERGENCY DEPT VISIT LOW MDM: CPT

## 2024-09-07 PROCEDURE — 87635 SARS-COV-2 COVID-19 AMP PRB: CPT

## 2024-09-07 PROCEDURE — 87651 STREP A DNA AMP PROBE: CPT

## 2024-09-07 ASSESSMENT — ENCOUNTER SYMPTOMS
DIARRHEA: 0
ALLERGIC/IMMUNOLOGIC NEGATIVE: 1
EYE PAIN: 0
COLOR CHANGE: 0
COUGH: 1
ABDOMINAL PAIN: 0
TROUBLE SWALLOWING: 0
NAUSEA: 0
VOMITING: 0
SORE THROAT: 1
APNEA: 0
SHORTNESS OF BREATH: 0

## 2024-09-07 ASSESSMENT — PAIN DESCRIPTION - LOCATION: LOCATION: THROAT

## 2024-09-07 ASSESSMENT — PAIN DESCRIPTION - DESCRIPTORS: DESCRIPTORS: SORE

## 2024-09-07 ASSESSMENT — PAIN SCALES - GENERAL: PAINLEVEL_OUTOF10: 4

## 2024-09-07 ASSESSMENT — LIFESTYLE VARIABLES
HOW OFTEN DO YOU HAVE A DRINK CONTAINING ALCOHOL: 2-4 TIMES A MONTH
HOW MANY STANDARD DRINKS CONTAINING ALCOHOL DO YOU HAVE ON A TYPICAL DAY: 1 OR 2

## 2024-09-07 ASSESSMENT — PAIN - FUNCTIONAL ASSESSMENT: PAIN_FUNCTIONAL_ASSESSMENT: 0-10

## 2024-09-07 NOTE — ED TRIAGE NOTES
A & Ox4. Skin pink warm and dry. Pt states only complaint now is sore throat. States took Nyquil last night that helped but nothing today so far.

## 2024-09-07 NOTE — DISCHARGE INSTR - COC
Continuity of Care Form    Patient Name: Alesia Crandall   :  1995  MRN:  [id number]    Admit date:  2024  Discharge date:  ***    Code Status Order: Prior   Advance Directives:   Advance Care Flowsheet Documentation             Admitting Physician:  No admitting provider for patient encounter.  PCP: Alexsander Echevarria MD    Discharging Nurse: ***  Discharging Hospital Unit/Room#: SFT02/SFT02  Discharging Unit Phone Number: ***    Emergency Contact:   Extended Emergency Contact Information  Primary Emergency Contact: BARRINGTON JOSEPH  Home Phone: [phone]  Mobile Phone: [phone]  Relation: Parent    Past Surgical History:  Past Surgical History:   Procedure Laterality Date     SECTION N/A 2023     SECTION performed by Alejandra Ham MD at [facility] OR    EAR SURGERY Right     TONSILLECTOMY      UPPER GASTROINTESTINAL ENDOSCOPY N/A 10/20/2022    EGD WITH BIOPSY performed by Ken Mendez MD at [facility]       Immunization History:   Immunization History   Administered Date(s) Administered    COVID-19, PFIZER PURPLE top, DILUTE for use, (age 12 y+), 30mcg/0.3mL 2022, 2022    TDaP, ADACEL (age 10y-64y), BOOSTRIX (age 10y+), IM, 0.5mL 2023       Active Problems:  Patient Active Problem List   Diagnosis Code    Left upper quadrant abdominal pain R10.12    Headache in pregnancy, antepartum, third trimester O26.893, R51.9    Admitted to labor and delivery Z78.9    Normal labor and delivery O80     delivery delivered O82       Isolation/Infection:   Isolation            No Isolation          Patient Infection Status       None to display                     Nurse Assessment:  Last Vital Signs: BP (!) 146/94   Pulse 87   Temp 98 °F (36.7 °C) (Oral)   Resp 16   Ht 1.524 m (5')   Wt 71.7 kg (158 lb)   LMP 2024   SpO2 100%   BMI 30.86 kg/m²     Last documented pain score (0-10 scale): Pain Level: 4  Last Weight:   Wt Readings from Last 1

## 2024-09-07 NOTE — DISCHARGE INSTRUCTIONS
You can use Tylenol and/or Motrin as needed for pain.  You can also use over-the-counter symptom relief such as NyQuil or DayQuil as needed.  Schedule a follow-up appointment with your PCP as discussed.  Return to the emergency department for any new or worsening symptoms.

## 2024-09-07 NOTE — ED PROVIDER NOTES
[facility] ED  eMERGENCYdEPARTMENT eNCOUnter        Pt Name: Alesia Crandall  MRN: [id number]  Birthdate 1995of evaluation: 9/7/2024  Provider:Ximena Orourke PA-C  9:37 AM EDT    CHIEF COMPLAINT       Chief Complaint   Patient presents with    Pharyngitis     Started with HA, body aches for a week, but went away. Started with sore throat 2 days ago         HISTORY OF PRESENT ILLNESS  (Location/Symptom, Timing/Onset, Context/Setting, Quality, Duration, Modifying Factors, Severity.)   Alesia Crandall is a 29 y.o. female who presents to the emergency department for evaluation of sore throat x 2 days.  Patient was that she started feeling sick about a week ago with headache, body aches, congestion, and slight cough but the symptoms are all improving and she started feeling a sore throat about 2 days ago.  Patient reports that she has a history of tonsillectomy intending that she needed strep throat because of this, but now she is concerned that she may have strep.  Patient reports that pain is worse at night and rates pain 4 out of 10 currently.  Patient did not take any medication prior to arrival.  Patient denies any fevers, ear pain, chest pain, shortness of breath, abdominal pain, nausea, vomiting, diarrhea.    HPI    Nursing Notes were reviewed and I agree.    REVIEW OF SYSTEMS    (2-9 systems for level 4, 10 or more for level 5)     Review of Systems   Constitutional:  Negative for diaphoresis and fever.   HENT:  Positive for congestion and sore throat. Negative for hearing loss and trouble swallowing.    Eyes:  Negative for pain.   Respiratory:  Positive for cough (resolving). Negative for apnea and shortness of breath.    Cardiovascular:  Negative for chest pain.   Gastrointestinal:  Negative for abdominal pain, diarrhea, nausea and vomiting.   Endocrine: Negative.    Genitourinary:  Negative for hematuria.   Musculoskeletal:  Negative for neck pain and neck stiffness.   Skin:  Negative for        (Please note that portions of this note were completed with a voice recognition program.  Efforts were made to edit the dictations but occasionally words are mis-transcribed.)    Ximena Orourke PA-C    Supervising Physician Ximena Portilol PA-C  09/07/24 1006       Ximena Orourke PA-C  09/07/24 1007

## 2024-10-21 NOTE — PROGRESS NOTES
History Of Present Illness:  Emma Leggett is a 29 y.o. female with a history of right cholesteatoma and right conductive hearing loss, she is s/p R CWR tympanomastoidectomy on 8/28/20 and second look with removal of cholesteatoma with OCR (TORP 5.5mm) on 6/14/21.    Recall 10/17/24:  Emma Leggett is a 28 y.o. female with a history of right cholesteatoma and right conductive hearing loss, she is s/p R CWR tympanomastoidectomy on 8/28/20 and second look with removal of cholesteatoma with OCR (TORP 5.5mm) on 6/14/21.     She is here for her annual Follow-up. Recently had a baby. She feels her hearing is doing well. She does not really notice a difference or deficient in her hearing. She has no drainage, no pain, no dizziness. No infections in the last year.     Interval history (10/22/2024):  -States that she does think her hearing has gotten worse over the past year. She has noticed that it has been bothering her more now  -Interested in hearing aids now  -Intermittent tinnitus  -No vertigo or otorrhea  -Works as a [employer]    Surgical History:  She has a past surgical history that includes Other surgical history (05/05/2020).     Allergies:  Patient has no known allergies.    Medications:   No current outpatient medications   Review of Systems:   A comprehensive 10-point review of systems was obtained including constitutional, neurological, HEENT, pulmonary, cardiovascular, genito-urinary, and other pertinent systems and was negative except as noted in the HPI.      Physical Exam:  Constitutional   General appearance: Healthy-appearing, well-nourished, well groomed, in no acute distress.   Ability to communicate: Normal communication without aids, normal voice quality.       Head and face: Atraumatic with no masses, lesions, or scarring.   Facial strength: Normal strength and symmetry, no synkinesis or facial tic.     Ears  Otoscopic examination: Bilateral normal otoscopy of the tympanic membrane. Mild cerumen burden  on the right side, just medial to the TM. No evidence of otorrhea, perforation, or retraction pockets.     Nose: Dorsum symmetric with no visible or palpable deformities.     Oral Cavity/Mouth  Lips, teeth, and gums: Normal lips, gums, and dentition.     Oropharynx: Mucosa moist, no lesions.     Neck: Symmetrical, trachea midline. No masses visible.     Neurological/Psychiatric  Cranial Nerve Examination: II - XII grossly intact.  Orientation to person, place, and time: Normal.  Mood and affect: Normal.     Skin: Normal without rashes or lesions.     Pulmonary  Respiratory effort: Chest expands symmetrically.     Cardiovascular: Good peripheral pulses  Peripheral vascular system: No varicosities, carotid pulse normal, no edema. No jugular venous distension.     Extremities: Appearance of extremities: Normal. Gait normal.     Last Recorded Vitals:  There were no vitals taken for this visit.    Audiogram (10/22/2024):  Normal hearing on the left with no air-bone gaps  Moderate to moderately conductive hearing loss on the right with air bone gap 30-50dB  Type A tymps bilaterally  WRS 92% on the right      Assessment/Plan   29 y.o. female with a history of right cholesteatoma and right conductive hearing loss, she is s/p R CWR tympanomastoidectomy on 8/28/20 and second look with removal of cholesteatoma with OCR (TORP 5.5mm) on 6/14/21.    She was known to have a short prosthesis with associated moderate conductive hearing loss, for which she opted not to have a revision of her prosthesis. Now, she has noted worsening of her hearing and she would like to proceed with rehabilitation options, now that she is insured.  There is no evidence of recurent cholesteatoma on her exam today. Three options for proceeding were discussed with her.     Revision of her prosthesis if her CT scan shows that the prosthesis has dislodged  Bone anchored hearing aid  Cros hearing aid    She is okay with proceeding with either option, but  would like to minimize the number of times she needs to be under Anesthesia. She is agreeable to making a decision after she gets a CT scan.       Rhonda Calvo MD  PGY-5    I saw and evaluated the patient. I personally obtained the key and critical portions of the history and physical exam or was physically present for key and critical portions performed by the resident/fellow. I reviewed the resident/fellow's documentation and discussed the patient with the resident/fellow. I agree with the resident/fellow's medical decision making as documented in the note.    Nneka Renteria MD

## 2024-10-22 ENCOUNTER — CLINICAL SUPPORT (OUTPATIENT)
Dept: AUDIOLOGY | Facility: CLINIC | Age: 29
End: 2024-10-22
Payer: COMMERCIAL

## 2024-10-22 ENCOUNTER — APPOINTMENT (OUTPATIENT)
Dept: OTOLARYNGOLOGY | Facility: CLINIC | Age: 29
End: 2024-10-22
Payer: MEDICAID

## 2024-10-22 VITALS — BODY MASS INDEX: 29.07 KG/M2 | WEIGHT: 154 LBS | HEIGHT: 61 IN

## 2024-10-22 DIAGNOSIS — H90.A11 CONDUCTIVE HEARING LOSS OF RIGHT EAR WITH RESTRICTED HEARING OF LEFT EAR: ICD-10-CM

## 2024-10-22 DIAGNOSIS — H90.71 MIXED CONDUCTIVE AND SENSORINEURAL HEARING LOSS OF RIGHT EAR WITH UNRESTRICTED HEARING OF LEFT EAR: Primary | ICD-10-CM

## 2024-10-22 DIAGNOSIS — H65.491 OTHER CHRONIC NONSUPPURATIVE OTITIS MEDIA OF RIGHT EAR: Primary | ICD-10-CM

## 2024-10-22 PROBLEM — H66.90 CHRONIC OTITIS MEDIA: Status: ACTIVE | Noted: 2024-10-22

## 2024-10-22 PROCEDURE — 99213 OFFICE O/P EST LOW 20 MIN: CPT | Performed by: OTOLARYNGOLOGY

## 2024-10-22 PROCEDURE — 1036F TOBACCO NON-USER: CPT | Performed by: OTOLARYNGOLOGY

## 2024-10-22 PROCEDURE — 92557 COMPREHENSIVE HEARING TEST: CPT | Performed by: AUDIOLOGIST

## 2024-10-22 PROCEDURE — 92550 TYMPANOMETRY & REFLEX THRESH: CPT | Performed by: AUDIOLOGIST

## 2024-10-22 PROCEDURE — 92504 EAR MICROSCOPY EXAMINATION: CPT | Performed by: OTOLARYNGOLOGY

## 2024-10-22 PROCEDURE — 3008F BODY MASS INDEX DOCD: CPT | Performed by: OTOLARYNGOLOGY

## 2024-10-22 ASSESSMENT — PATIENT HEALTH QUESTIONNAIRE - PHQ9
SUM OF ALL RESPONSES TO PHQ9 QUESTIONS 1 AND 2: 0
2. FEELING DOWN, DEPRESSED OR HOPELESS: NOT AT ALL
1. LITTLE INTEREST OR PLEASURE IN DOING THINGS: NOT AT ALL

## 2024-10-22 NOTE — PROGRESS NOTES
AUDIOLOGY ADULT AUDIOMETRIC EVALUATION      Name:  Emma Leggett  :  1995  Age:  29 y.o.  Date of Evaluation:  10/22/2024    IMPRESSIONS     Today's test results are consistent with normal hearing sensitivity in the left ear and a severe mixed hearing loss from 125-250 Hz rising to moderate from 500-2000 Hz sloping to a severe mixed hearing loss. Discussed results and recommendations with patient. Patient is a hearing aid candidate in the right ear. Patient could be considered for bone anchored implantation as well, pending clearance from Dr. Nneka Renteria. Questions were addressed and the patient was encouraged to contact our department should concerns arise.    RECOMMENDATIONS     Continue medical follow up with PCP/ENT.  Check insurance benefits for hearing aids. Call to schedule hearing aid consultation if interested in pursuing a hearing aid on the right ear.   Return for annual hearing evaluation or sooner should concerns arise.    Time: 1061-3278    HISTORY     Reason for visit:  Emma Leggett is seen today in conjunction with Nneka Renteria MD for an evaluation of hearing.  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 She reports very intermittent pain, pressure, and tinnitus in the right ear. Denied dizziness, ear infections, and drainage. She is highly motivated to have amplification on the right ear.     Recall:  Patient has a known history of right cholesteatoma and right CHL s/p R CWR tympanomastoidectomy 2020 and second look transcanal tympanoplasty with OCR (TORP 5.5mm) on 21. Patient complains of no significant change in hearing however recently gave birth in August of 2023. She notes intermittent bilateral tinnitus and aural fullness. Symptoms are non-bothersome to her at this time. Patient denied history of dizziness or excessive noise exposure. No history of hearing aid use.    EVALUATION     See  Audiogram    TEST RESULTS     Otoscopic Evaluation:  Right Ear: Clear ear canals.  Left Ear: Clear ear canals.    Tympanometry & Acoustic Reflexes:  Right Ear: Reduced eardrum mobility consistent with outer/middle ear involvement. Absent ipsilateral reflexes 500-4000 Hz  Left Ear: Normal middle ear pressure and eardrum mobility. Absent ipsilateral reflexes 500-4000 Hz    Pure Tone Audiometry:    Right Ear: Severe mixed hearing loss from 125-250 Hz rising to moderate from 500-2000 Hz sloping to a severe mixed hearing loss  Left Ear: Normal hearing sensitivity.    Speech Audiometry:   Right Ear:  Speech Reception Threshold (SRT) was obtained at 55 dBHL. Word Recognition scores were excellent in quiet when words were presented at 90 dBHL.  Left Ear:  Speech Reception Threshold (SRT) was obtained at 10 dBHL. Word Recognition scores were excellent in quiet when words were presented at 50 dBHL.    TERRY Kelly. [facility]  Audiology Extern    Madeleine Youngblood CCC-A  Licensed Clinical Audiologist            Degree of Hearing Sensitivity Decibel Range   Within Normal Limits (WNL) 0-25   Mild 26-40   Moderate 41-55   Moderately-Severe 56-70   Severe 71-90   Profound 91+      Key   CNT/DNT Could Not Test/Did Not Test   TM Tympanic Membrane   WNL Within Normal Limits   HA Hearing Aid   SNHL Sensorineural Hearing Loss   CHL Conductive Hearing Loss   NIHL Noise-Induced Hearing Loss   ECV Ear Canal Volume   MLV Monitored Live Voice

## 2024-11-06 ENCOUNTER — HOSPITAL ENCOUNTER (OUTPATIENT)
Dept: RADIOLOGY | Facility: CLINIC | Age: 29
Discharge: HOME | End: 2024-11-06
Payer: COMMERCIAL

## 2024-11-06 DIAGNOSIS — H90.A11 CONDUCTIVE HEARING LOSS OF RIGHT EAR WITH RESTRICTED HEARING OF LEFT EAR: ICD-10-CM

## 2024-11-06 PROCEDURE — 70480 CT ORBIT/EAR/FOSSA W/O DYE: CPT | Performed by: RADIOLOGY

## 2024-11-06 PROCEDURE — 70480 CT ORBIT/EAR/FOSSA W/O DYE: CPT

## 2024-11-15 DIAGNOSIS — H90.A11 CONDUCTIVE HEARING LOSS OF RIGHT EAR WITH RESTRICTED HEARING OF LEFT EAR: Primary | ICD-10-CM

## 2024-11-15 NOTE — PROGRESS NOTES
Reviewed CT results- appears that the TORP has tipped over and is not in good contact with drum. ME is well aerated and no evidence of recurrent cholesteatoma.   Called patient to discuss options for hearing loss. She would like to discuss NIESHA with audiology and do a BAHA trial. Will discuss her choices after the NIESHA eval.

## 2024-11-26 ENCOUNTER — CLINICAL SUPPORT (OUTPATIENT)
Dept: AUDIOLOGY | Facility: CLINIC | Age: 29
End: 2024-11-26
Payer: COMMERCIAL

## 2024-11-26 DIAGNOSIS — H90.11 CONDUCTIVE HEARING LOSS OF RIGHT EAR WITH UNRESTRICTED HEARING OF LEFT EAR: Primary | ICD-10-CM

## 2024-11-26 DIAGNOSIS — H90.A11 CONDUCTIVE HEARING LOSS OF RIGHT EAR WITH RESTRICTED HEARING OF LEFT EAR: ICD-10-CM

## 2024-11-26 PROCEDURE — 92557 COMPREHENSIVE HEARING TEST: CPT

## 2024-11-26 PROCEDURE — 92567 TYMPANOMETRY: CPT

## 2024-12-10 NOTE — PROGRESS NOTES
AUDIOLOGIC EVALUATION      Name:  Emma Leggett  :  1995  Age:  29 y.o.  Date of Evaluation:  2024    Time: 8342-7135    HISTORY:  Emma Leggett, 29 y.o., was seen for an audiologic assessment at the request of Dr. Nneka Renteria. She has a history of right cholesteatoma, CWR tympanomastoidectomy (20) and second look with removal of cholesteatoma and OCR on 21. She feels that her hearing loss has been causing more difficulty for her. She is interested in amplification options. She has intermittent tinnitus. She denied otalgia, otorrhea, dizziness, history of noise exposure, family history of hearing loss, and recent falls.       RESULTS:  Otoscopic Evaluation:  Right Ear: Clear Canal  Left Ear: Clear Canal    Immittance Measures:  Right Ear: Type As -- indicating normal volume, pressure, and reduced tympanic membrane compliance  Left Ear: Type A -- indicating normal volume, pressure, and static compliance    Acoustic Reflexes:  Right Ear: Did not test.  Left Ear: Did not test.    Pure Tone Audiometry:    Right Ear: Moderately-severe mixed hearing loss 125-250 Hz rising to a moderate 500-3000 Hz sloping to moderately-severe 4925-6126 Hz    Left Ear: Hearing within normal limits 125-8000 Hz    Asymmetry: Yes, right ear poorer 125-8000 Hz    In comparison to previous audio completed on 10/22/24, her hearing has remained stable in both ears.     Reliability:   Good    Speech Audiometry:   Right: Speech Reception Threshold (SRT) was obtained at 55 dBHL w/ masking.   SRT/PTA in Good agreement with pure tone average.    Left: Speech Reception Threshold (SRT) was obtained at 5 dBHL.   SRT/PTA in Good agreement with pure tone average.    Word Recognition Scores (WRS):  Right Ear: excellent (100%) in quiet when words were presented at 90 dBHL w/ masking.   Left Ear: excellent (100%) in quiet when words were presented at 50 dBHL w/ masking.     Asymmetry: No      IMPRESSIONS:  Today's testing revealed  normal ear canal volume, middle ear pressure, and reduced tympanic membrane compliance in the right ear. In the left ear she has normal ear canal volume, middle ear pressure, and tympanic membrane compliance. She has moderately-severe mixed hearing loss 125-250 Hz rising to a moderate 500-3000 Hz sloping to moderately-severe 5834-9859 Hz in the right ear. In the left ear she has hearing within normal limits 125-8000 Hz. She has a noted asymmetry, right ear poorer 125-8000 Hz. In comparison to previous audio completed on 10/22/24, her hearing has remained stable in both ears. The results were discussed with the patient.     She wanted to proceed with a hearing aid flex trial for her right ear. She was fit with a BeatSwitch  hearing aid with a power  and small power dome. She was fit at 100% gain. She does have slight feedback, however, in lieu of reducing gain she wanted to leave it programmed as is, with the recommendation of having an earmold made if she proceeds with her own hearing aid. She noted significant improvement in-office.     I recommend that when she returns a bone anchored implant evaluation be completed with myself or Dr. Shelbie Robb, with aided testing. She noted her options for management include revision of her prosthesis if her CT scan shows that the prosthesis has dislodged, a BAHA, and traditional hearing aid. She would like to reduce the amount of times she goes under anesthesia.          RECOMMENDATIONS:  1.) Continue medical follow up with Ears Nose and Throat (ENT) provider as recommended.  2.) Return for flex trial follow-up and bane anchored implant evaluation. The audiology department can be reached at [phone] to schedule an appointment.   3.) During the trial strive for full-time device use during waking hours, except when activities preclude device safety.      Mira Recinos, CCC-A  Clinical Audiologist      KEY  SNHL Sensorineural Hearing Loss   CHL  Conductive Hearing Loss   MHL Mixed Hearing Loss   SSNHL Sudden Sensorineural Hearing Loss   WNL Within Normal Limits   PTA Pure Tone Average   TM Tympanic Membrane   ECV Ear Canal Volume   SRT Speech Reception Threshold   WRS Word Recognition Score

## 2024-12-13 ENCOUNTER — CLINICAL SUPPORT (OUTPATIENT)
Dept: AUDIOLOGY | Facility: CLINIC | Age: 29
End: 2024-12-13
Payer: COMMERCIAL

## 2024-12-13 DIAGNOSIS — H90.11 CONDUCTIVE HEARING LOSS OF RIGHT EAR WITH UNRESTRICTED HEARING OF LEFT EAR: Primary | ICD-10-CM

## 2024-12-13 PROCEDURE — 92626 EVAL AUD FUNCJ 1ST HOUR: CPT

## 2024-12-13 ASSESSMENT — PAIN SCALES - GENERAL: PAINLEVEL_OUTOF10: 0 - NO PAIN

## 2024-12-13 ASSESSMENT — PAIN - FUNCTIONAL ASSESSMENT: PAIN_FUNCTIONAL_ASSESSMENT: 0-10

## 2024-12-13 NOTE — LETTER
2024     Nneka Renteria MD  [address] [facility], [place] [address]  [facility] [address]    Patient: Emma Leggett   YOB: 1995   Date of Visit: 2024       Dear Dr. Nneka Renteria MD:    Thank you for referring Emma Leggett to me for evaluation. Below are my notes for this consultation.  If you have questions, please do not hesitate to call me. I look forward to following your patient along with you.       Sincerely,     Nato Kim      CC: NATO Saxena, CCC-A  Tamiko Flores RN  ______________________________________________________________________________________                                                                                AUDIOLOGY BONE ANCHORED IMPLANT (NIESHA) EVALUATION    Name:  Emma Leggett  :  1995  Age:  29 y.o.  Date of Evaluation: 2024    HISTORY  Emma Leggett, 29 y.o., was seen for for a NIESHA evaluation at the request of Dr. Nneka Renteria. She has a history of right cholesteatoma, CWR tympanomastoidectomy (20) and second look with removal of cholesteatoma and OCR on 21. She feels that her hearing loss has been causing more difficulty for her. She is interested in amplification options. She has intermittent tinnitus. She denied otalgia, otorrhea, dizziness, history of noise exposure, family history of hearing loss, and recent falls.     She completed a hearing aid trial with Dr. Amaya Soto on 2024. She returned the device today. She reports she liked the power the traditional hearing aid was able to provide.     AIDED TESTING   Testing completed at 45 degrees azimuth to the speaker. The better ear was masked using an insert.     TRADITIONAL HEARING AID  AIDED: CNC words at 50 dB HL= 96%    AIDED: AzBIO sentences at 50 dB HL = 100%   Aided thresholds were obtained from 20 dBHL - 25 dBHL for the frequencies of 250-6000 Hz.     BAHA ON SOFTBAND  AIDED: CNC words at 50 dB HL= 65%    AIDED: AzBIO sentences at 50 dB  HL = 99%   Aided thresholds were obtained from 25 dBHL - 40 dBHL for the frequencies of 250-6000 Hz.     UNAIDED: CNC words at 50 dB HL= 7%    UNAIDED: AzBIO sentences at 50 dB HL = <1%     IMPRESSION   Functional testing revealed excellent word and sentences recognition when noise is introduced to the better hearing ear. This is a significant improvement as compared to unaided word and sentence recognition. Emma reported she liked the traditional hearing aid better than the BAHA on the softband.    Discussed the advantages and limitations of the traditional hearing aid and NIESHA system including the percutaneous, transcutaneous, softband/SoundArc, ADHear .    At this point she would like to follow up with Dr. Renteria to determine what to do with the failed prosthetic and pursue a traditional hearing aid with Dr. Soto.     TREATMENT PLAN:  1. Continue medical follow-up with our otologist/ENT team for further consideration of a NIESHA.  2. Return after ENT consultation for ALIN and hearing aid discussion.  4. Annual hearing assessments.  5. Contact clinic with questions or concerns at [phone].    Time stamp: 45 minutes     Completed by:  Mira Walton, CCC-A, [facility]  Licensed Audiologist

## 2024-12-13 NOTE — PROGRESS NOTES
AUDIOLOGY BONE ANCHORED IMPLANT (NIESHA) EVALUATION    Name:  Emma Leggett  :  1995  Age:  29 y.o.  Date of Evaluation: 2024    HISTORY  Emma Leggett, 29 y.o., was seen for for a NIESHA evaluation at the request of Dr. Nneka Renteria. She has a history of right cholesteatoma, CWR tympanomastoidectomy (20) and second look with removal of cholesteatoma and OCR on 21. She feels that her hearing loss has been causing more difficulty for her. She is interested in amplification options. She has intermittent tinnitus. She denied otalgia, otorrhea, dizziness, history of noise exposure, family history of hearing loss, and recent falls.     She completed a hearing aid trial with Dr. Amaya Soto on 2024. She returned the device today. She reports she liked the power the traditional hearing aid was able to provide.     AIDED TESTING   Testing completed at 45 degrees azimuth to the speaker. The better ear was masked using an insert.     TRADITIONAL HEARING AID  AIDED: CNC words at 50 dB HL= 96%    AIDED: AzBIO sentences at 50 dB HL = 100%   Aided thresholds were obtained from 20 dBHL - 25 dBHL for the frequencies of 250-6000 Hz.     BAHA ON SOFTBAND  AIDED: CNC words at 50 dB HL= 65%    AIDED: AzBIO sentences at 50 dB HL = 99%   Aided thresholds were obtained from 25 dBHL - 40 dBHL for the frequencies of 250-6000 Hz.     UNAIDED: CNC words at 50 dB HL= 7%    UNAIDED: AzBIO sentences at 50 dB HL = <1%     IMPRESSION   Functional testing revealed excellent word and sentences recognition when noise is introduced to the better hearing ear. This is a significant improvement as compared to unaided word and sentence recognition. Emma reported she liked the traditional hearing aid better than the BAHA on the softband.    Discussed the advantages and limitations of the traditional hearing aid and NIESHA system including the  percutaneous, transcutaneous, softband/SoundArc, ADHear .    At this point she would like to follow up with Dr. Renteria to determine what to do with the failed prosthetic and pursue a traditional hearing aid with Dr. Soto.     TREATMENT PLAN:  1. Continue medical follow-up with our otologist/ENT team for further consideration of a NIESHA.  2. Return after ENT consultation for ALIN and hearing aid discussion.  4. Annual hearing assessments.  5. Contact clinic with questions or concerns at [phone].    Time stamp: 45 minutes     Completed by:  Mira Walton, CCC-A, [facility]  Licensed Audiologist

## 2025-01-27 NOTE — PROGRESS NOTES
History Of Present Illness:  Emma Leggett is a 29 y.o. female with a history of right cholesteatoma and right conductive hearing loss, she is s/p R CWR tympanomastoidectomy on 8/28/20 and second look with removal of cholesteatoma with OCR (TORP 5.5mm) on 6/14/21. Patient completed her BAHA evaluation on 11/26/24. States she liked using the traditional hearing aid over the BAHA.     Recall 10/22/24:  Emma Leggett is a 29 y.o. female with a history of right cholesteatoma and right conductive hearing loss, she is s/p R CWR tympanomastoidectomy on 8/28/20 and second look with removal of cholesteatoma with OCR (TORP 5.5mm) on 6/14/21.     Interval history (10/22/2024):  -States that she does think her hearing has gotten worse over the past year. She has noticed that it has been bothering her more now  -Interested in hearing aids now  -Intermittent tinnitus  -No vertigo or otorrhea  -Works as a [employer]    Surgical History:  She has a past surgical history that includes Other surgical history (05/05/2020).     Allergies:  Patient has no known allergies.    Medications:   No current outpatient medications   Review of Systems:   A comprehensive 10-point review of systems was obtained including constitutional, neurological, HEENT, pulmonary, cardiovascular, genito-urinary, and other pertinent systems and was negative except as noted in the HPI.      Physical Exam:  Constitutional   General appearance: Healthy-appearing, well-nourished, well groomed, in no acute distress.   Ability to communicate: Normal communication without aids, normal voice quality.       Head and face: Atraumatic with no masses, lesions, or scarring.   Facial strength: Normal strength and symmetry, no synkinesis or facial tic.     Ears  Otoscopic examination:   Right: Drum is intact, prosthetic is not visible through the TM, no evidence of cholesteatoma.      Nose: Dorsum symmetric with no visible or palpable deformities.     Oral Cavity/Mouth  Lips,  "teeth, and gums: Normal lips, gums, and dentition.     Oropharynx: Mucosa moist, no lesions.     Neck: Symmetrical, trachea midline. No masses visible.     Neurological/Psychiatric  Cranial Nerve Examination: II - XII grossly intact.  Orientation to person, place, and time: Normal.  Mood and affect: Normal.     Skin: Normal without rashes or lesions.     Pulmonary  Respiratory effort: Chest expands symmetrically.     Cardiovascular: Good peripheral pulses  Peripheral vascular system: No varicosities, carotid pulse normal, no edema. No jugular venous distension.     Extremities: Appearance of extremities: Normal. Gait normal.     Last Recorded Vitals:  Height 1.549 m (5' 1\"), weight 67.1 kg (148 lb).    Relevant Results:  I personally reviewed the NIESHA evaluation from 11/26/24 which demonstrated aided CNC word understanding at 96% and AzBio sentences of 100% with the traditional hearing aid. With the BAHA, she had CNC word understanding of 65% and AzBio sentences score of 99%. Unaided, CNC words was 7% and AzBio sentences score of <1%.      Assessment/Plan   29 y.o. female with a history of right cholesteatoma and right conductive hearing loss, she is s/p R CWR tympanomastoidectomy on 8/28/20 and second look with removal of cholesteatoma with OCR (TORP 5.5mm) on 6/14/21. Patient completed her BAHA evaluation on 11/26/24. States she liked using the traditional hearing aid over the BAHA. She will follow-up with audiology for hearing aid.     - RTC in 1 year with audiogram    Scribe Attestation  By signing my name below, ITamiko Scribe   attest that this documentation has been prepared under the direction and in the presence of Nneka Renteria MD.     I have reviewed the documentation as scribed by Carlos Ulloa and agree with the notes.   Nneka Renteria MD  "

## 2025-01-28 ENCOUNTER — CLINICAL SUPPORT (OUTPATIENT)
Dept: AUDIOLOGY | Facility: CLINIC | Age: 30
End: 2025-01-28
Payer: COMMERCIAL

## 2025-01-28 ENCOUNTER — APPOINTMENT (OUTPATIENT)
Dept: OTOLARYNGOLOGY | Facility: CLINIC | Age: 30
End: 2025-01-28
Payer: COMMERCIAL

## 2025-01-28 VITALS — WEIGHT: 148 LBS | BODY MASS INDEX: 27.94 KG/M2 | HEIGHT: 61 IN

## 2025-01-28 DIAGNOSIS — H65.491 OTHER CHRONIC NONSUPPURATIVE OTITIS MEDIA OF RIGHT EAR: Primary | ICD-10-CM

## 2025-01-28 DIAGNOSIS — H90.71 MIXED CONDUCTIVE AND SENSORINEURAL HEARING LOSS OF RIGHT EAR WITH UNRESTRICTED HEARING OF LEFT EAR: Primary | ICD-10-CM

## 2025-01-28 DIAGNOSIS — H90.A11 CONDUCTIVE HEARING LOSS OF RIGHT EAR WITH RESTRICTED HEARING OF LEFT EAR: ICD-10-CM

## 2025-01-28 PROCEDURE — 3008F BODY MASS INDEX DOCD: CPT | Performed by: OTOLARYNGOLOGY

## 2025-01-28 PROCEDURE — 99213 OFFICE O/P EST LOW 20 MIN: CPT | Performed by: OTOLARYNGOLOGY

## 2025-01-28 ASSESSMENT — PAIN - FUNCTIONAL ASSESSMENT: PAIN_FUNCTIONAL_ASSESSMENT: 0-10

## 2025-01-28 ASSESSMENT — PATIENT HEALTH QUESTIONNAIRE - PHQ9
1. LITTLE INTEREST OR PLEASURE IN DOING THINGS: NOT AT ALL
SUM OF ALL RESPONSES TO PHQ9 QUESTIONS 1 AND 2: 0
2. FEELING DOWN, DEPRESSED OR HOPELESS: NOT AT ALL

## 2025-01-28 ASSESSMENT — PAIN SCALES - GENERAL: PAINLEVEL_OUTOF10: 0 - NO PAIN

## 2025-01-28 NOTE — PROGRESS NOTES
AUDIOLOGY HEARING AID EVALUATION      Name:  Emma Leggett  :  1995  Age:  29 y.o.  Date of Encounter:  2025     Time: 4424-2146    HISTORY:  Ms. Leggett was seen today for a hearing aid consultation. A previous hearing evaluation on 24 indicated moderately-severe mixed hearing loss 125-250 Hz rising to a moderate 500-3000 Hz sloping to moderately-severe 6249-8235 Hz in the right ear. In the left ear she has hearing within normal limits 125-8000 Hz. She has a noted asymmetry, right ear poorer 125-8000 Hz. She has excellent word recognition in  both ears (100%). She also completed a bone anchored implant evaluation on 24 with the noted results:     TRADITIONAL HEARING AID  AIDED: CNC words at 50 dB HL= 96%    AIDED: AzBIO sentences at 50 dB HL = 100%   Aided thresholds were obtained from 20 dBHL - 25 dBHL for the frequencies of 250-6000 Hz.      BAHA ON SOFTBAND  AIDED: CNC words at 50 dB HL= 65%    AIDED: AzBIO sentences at 50 dB HL = 99%   Aided thresholds were obtained from 25 dBHL - 40 dBHL for the frequencies of 250-6000 Hz.      UNAIDED: CNC words at 50 dB HL= 7%    UNAIDED: AzBIO sentences at 50 dB HL = <1%       IMPRESSIONS:  The hearing test from 24 and the NIESHA evaluation from 24 was reviewed with the patient. Her follow-up with Dr. Renteria revealed monitoring of the middle ear prosthesis w/o removal. She is a hearing aid candidate in the right ear. The benefits and drawbacks of different hearing aid styles and levels of technology were reviewed. Pricing and policies were reviewed. Basic hearing aid use and parts were discussed. She perceived the most benefit from the traditional right hearing aid. She would like to order a right Phonak OVIVO Mobile Communicationseo I70-R hearing aid with a 0P  in the color P1. It will be coupled with an acrylic slim tip canal earmold. Earmold impressions taken without incident. Pre- and post- otoscopy were unremarkable.    Hearing Aid Information Right     Phonak   Model Audéo I70-R    0P   Earmold Slim Tip   Acrylic       Prior authorization will be submitted to her insurance. Upon approval, I will order her device and earmold from the  and we will schedule her for a hearing aid fitting. No charge for today's appointment as we've previously discussed her coming back for device selection following follow-up with Dr. Renteria.       RECOMMENDATIONS:   1.) Return for hearing aid fitting once device arrives.  2.) Contact your audiologist with any questions or concerns prior to fitting.       NATO Saxena, CCC-A   Clinical Audiologist

## 2025-03-20 ENCOUNTER — CLINICAL SUPPORT (OUTPATIENT)
Dept: AUDIOLOGY | Facility: CLINIC | Age: 30
End: 2025-03-20
Payer: COMMERCIAL

## 2025-03-20 DIAGNOSIS — H90.71 MIXED CONDUCTIVE AND SENSORINEURAL HEARING LOSS OF RIGHT EAR WITH UNRESTRICTED HEARING OF LEFT EAR: Primary | ICD-10-CM

## 2025-03-20 PROCEDURE — V5247 HEARING AID, PROG, MON, BTE: HCPCS

## 2025-03-20 PROCEDURE — V5264 EAR MOLD/INSERT: HCPCS | Mod: RT

## 2025-03-20 PROCEDURE — V5241 DISPENSING FEE, MONAURAL: HCPCS

## 2025-03-20 NOTE — PROGRESS NOTES
HEARING AID FITTING    Name:  Emma Leggett  :  1995  Age:  29 y.o.  Date:  3/20/2025    Time: 9:00-10:00    HISTORY     Emma Leggett was seen today for a hearing aid fitting. Previous audiologic testing completed on 2024 showed moderately-severe mixed hearing loss rising to moderate and sloping to moderately-severe in the right ear and normal hearing sensitivity of the left ear. Medical clearance was provided by Nneka Renteria MD.     HEARING AID INFORMATION     Right   Date of Fitting 3/20/2025   Clinic  [facility]    Phonak   Model Audeo I70-R   Serial Number [id number]   Style DAGO   Warranty (Repair & L/D) 2028   Battery R   /Dome 0P / SlimTip Acrylic   (Serial #: [id number])   Earmold Warranty 2025    SN [id number]      PROCEDURE     Emma was dispensed their new hearing aids at today's appointment. We reviewed cleaning and care of hearing aids, including identifying parts of the hearing aid, replacing the wax traps, and changing the domes. We discussed charging the devices and general guidelines for use. Patient was educated on the 3 year loss and damage warranty expiring on 2028. Emma was given the educational materials provided by the . Patient was able to demonstrate proper insertion and removal of hearing aids, cleaning techniques, and use of volume control button. Emma was pleased with the sound quality of the aids.     VERIFICATION  Hearing aid verification was completed with hearing aids at 100% targets using simulated REM. The hearing aids were verified to NAL-N2 targets using speech mapping for soft (55 dB), average (65 dB), and loud (75 dB) speech as well as MPO.    Device Benefit Self Assessment Scale was also used for pre-fitting verification measures today.    IMPRESSIONS AND RECOMMENDATIONS      Hearing aids should be worn during all waking hours for most adequate acclimatization and optimal hearing aid performance. Hearing aids  should not be submerged in water/liquid or stored in a high-moisture location (i.e. bathroom), or around high heat (i.e. on top of a fireplace).     Hearing aids should not be worn in loud environments (i.e. mowing the lawn, using power tools, concerts and shooting), instead, hearing protection should be worn. During periods of loud noise, hearing aids do not amplify loud sounds to cause harm, but they will not protect your hearing. Personal hearing protection such as ear plugs or earmuffs should be worn instead of the hearing aids when in these loud environments to decrease your risk of worsening your hearing. *Noise-induced hearing loss is the only type of preventable hearing loss*    The repair warranty and the conditions of the right-to-return period (30-days) were discussed and ends on 4/18/2025. The patient was informed that the hearing aid warranty is provided by the hearing aid , and not [facility].  audiologists are available to facilitate device and accessory repairs under warranty.  However, it is the hearing aid  that is responsible for all costs related to repairs under warranty.  Audiology will charge a fee to the patient for professional services to trouble shoot hearing aid device issues, to return the device to the  for repair, to reprogram the device if needed, and to re-dispense the device to the patient. The fee will vary based on the professional services provided, and can range from $32 - $200. The patient reports understanding of these conditions. Purchase agreement was signed (see scanned documents).    Charges for hearing aid, fitting fee, and earmold were billed to insurance today.     PLAN      Full time use of hearing aids during all waking and dry hours.  Return in two weeks for a hearing aid check.  Annual hearing evaluation due 11/2025.       Completed by ISAÍAS Chairez of [facility] under the supervision of Amaya  Charles, Mira CCC-A

## 2026-01-27 ENCOUNTER — APPOINTMENT (OUTPATIENT)
Dept: OTOLARYNGOLOGY | Facility: CLINIC | Age: 31
End: 2026-01-27
Payer: COMMERCIAL

## (undated) DEVICE — FORCEPS BX L240CM JAW DIA2.8MM L CAP W/ NDL MIC MESH TOOTH

## (undated) DEVICE — STANDARD SURGICAL GOWN, L: Brand: CONVERTORS

## (undated) DEVICE — TUBE SET 96 MM 64 MM H2O PERISTALTIC STD AUX CHANNEL

## (undated) DEVICE — STERILE NEOPRENE POWDER-FREE SURGICAL GLOVES WITH NITRILE COATING: Brand: PROTEXIS

## (undated) DEVICE — GLOVE ORANGE PI 8 1/2   MSG9085

## (undated) DEVICE — BRUSH ENDO CLN L90.5IN SHTH DIA1.7MM BRIST DIA5-7MM 2-6MM

## (undated) DEVICE — DRESSING TELFA STRL 3X6

## (undated) DEVICE — ENDO CARRY-ON PROCEDURE KIT: Brand: ENDO CARRY-ON PROCEDURE KIT

## (undated) DEVICE — SUTURE VCRL SZ 1 L36IN ABSRB UD L36MM CT-1 1/2 CIR J947H

## (undated) DEVICE — CONMED SCOPE SAVER BITE BLOCK, 20X27 MM: Brand: SCOPE SAVER

## (undated) DEVICE — ISLAND DRESSING 2IN X 3IN: Brand: SILVERLON ANTIMICROBIAL WOUND DRESSING

## (undated) DEVICE — Device: Brand: ENDO SMARTCAP

## (undated) DEVICE — SINGLE PORT MANIFOLD: Brand: NEPTUNE 2

## (undated) DEVICE — GLOVE ORTHO 8   MSG9480

## (undated) DEVICE — TUBING, SUCTION, 1/4" X 10', STRAIGHT: Brand: MEDLINE